# Patient Record
Sex: FEMALE | URBAN - METROPOLITAN AREA
[De-identification: names, ages, dates, MRNs, and addresses within clinical notes are randomized per-mention and may not be internally consistent; named-entity substitution may affect disease eponyms.]

---

## 2023-09-22 ENCOUNTER — TELEPHONE (OUTPATIENT)
Dept: OBGYN CLINIC | Facility: MEDICAL CENTER | Age: 38
End: 2023-09-22

## 2023-09-22 NOTE — TELEPHONE ENCOUNTER
Left message on vm to call back. Please let patient know she has to send her records in for review (blood work, [address]).  She may need to repeat ultrasound since 6 weeks is early and might not capture everything we need to see but we have to review first.

## 2023-09-22 NOTE — TELEPHONE ENCOUNTER
Patient called. Is aware needs to send her records to the office. The fax number was provided. Also is aware she may need to repeat ultrasound since 6 weeks is early.

## 2023-09-22 NOTE — TELEPHONE ENCOUNTER
Patient called. Is currently pregnant with 6 weeks per US. Didn't found her records. Patient is aware we only deliver in [facility] and is ok with that. Please review when you have a chance for next steps. Thank you!

## 2023-10-16 ENCOUNTER — INITIAL PRENATAL (OUTPATIENT)
Dept: OBGYN CLINIC | Facility: MEDICAL CENTER | Age: 38
End: 2023-10-16

## 2023-10-16 ENCOUNTER — APPOINTMENT (OUTPATIENT)
Dept: LAB | Facility: MEDICAL CENTER | Age: 38
End: 2023-10-16
Payer: COMMERCIAL

## 2023-10-16 VITALS
HEIGHT: 67 IN | WEIGHT: 178 LBS | BODY MASS INDEX: 27.94 KG/M2 | SYSTOLIC BLOOD PRESSURE: 110 MMHG | DIASTOLIC BLOOD PRESSURE: 76 MMHG

## 2023-10-16 DIAGNOSIS — Z34.81 PRENATAL CARE, SUBSEQUENT PREGNANCY, FIRST TRIMESTER: Primary | ICD-10-CM

## 2023-10-16 DIAGNOSIS — Z34.81 PRENATAL CARE, SUBSEQUENT PREGNANCY, FIRST TRIMESTER: ICD-10-CM

## 2023-10-16 LAB
ABO GROUP BLD: NORMAL
BASOPHILS # BLD AUTO: 0.07 THOUSANDS/ÂΜL (ref 0–0.1)
BASOPHILS NFR BLD AUTO: 1 % (ref 0–1)
BILIRUB UR QL STRIP: NEGATIVE
BLD GP AB SCN SERPL QL: NEGATIVE
CLARITY UR: CLEAR
COLOR UR: COLORLESS
EOSINOPHIL # BLD AUTO: 0.3 THOUSAND/ÂΜL (ref 0–0.61)
EOSINOPHIL NFR BLD AUTO: 3 % (ref 0–6)
ERYTHROCYTE [DISTWIDTH] IN BLOOD BY AUTOMATED COUNT: 13.2 % (ref 11.6–15.1)
GLUCOSE UR STRIP-MCNC: NEGATIVE MG/DL
HBV SURFACE AG SER QL: NORMAL
HCT VFR BLD AUTO: 39.3 % (ref 34.8–46.1)
HGB BLD-MCNC: 12.3 G/DL (ref 11.5–15.4)
HGB UR QL STRIP.AUTO: NEGATIVE
HIV 1+2 AB+HIV1 P24 AG SERPL QL IA: NORMAL
HIV 2 AB SERPL QL IA: NORMAL
HIV1 AB SERPL QL IA: NORMAL
HIV1 P24 AG SERPL QL IA: NORMAL
IMM GRANULOCYTES # BLD AUTO: 0.04 THOUSAND/UL (ref 0–0.2)
IMM GRANULOCYTES NFR BLD AUTO: 0 % (ref 0–2)
KETONES UR STRIP-MCNC: NEGATIVE MG/DL
LEUKOCYTE ESTERASE UR QL STRIP: NEGATIVE
LYMPHOCYTES # BLD AUTO: 2.27 THOUSANDS/ÂΜL (ref 0.6–4.47)
LYMPHOCYTES NFR BLD AUTO: 23 % (ref 14–44)
MCH RBC QN AUTO: 24.9 PG (ref 26.8–34.3)
MCHC RBC AUTO-ENTMCNC: 31.3 G/DL (ref 31.4–37.4)
MCV RBC AUTO: 80 FL (ref 82–98)
MONOCYTES # BLD AUTO: 0.77 THOUSAND/ÂΜL (ref 0.17–1.22)
MONOCYTES NFR BLD AUTO: 8 % (ref 4–12)
NEUTROPHILS # BLD AUTO: 6.38 THOUSANDS/ÂΜL (ref 1.85–7.62)
NEUTS SEG NFR BLD AUTO: 65 % (ref 43–75)
NITRITE UR QL STRIP: NEGATIVE
NRBC BLD AUTO-RTO: 0 /100 WBCS
PH UR STRIP.AUTO: 6 [PH]
PLATELET # BLD AUTO: 456 THOUSANDS/UL (ref 149–390)
PMV BLD AUTO: 9.7 FL (ref 8.9–12.7)
PROT UR STRIP-MCNC: NEGATIVE MG/DL
RBC # BLD AUTO: 4.94 MILLION/UL (ref 3.81–5.12)
RH BLD: POSITIVE
RUBV IGG SERPL IA-ACNC: 15.3 IU/ML
SP GR UR STRIP.AUTO: 1.01 (ref 1–1.03)
SPECIMEN EXPIRATION DATE: NORMAL
TREPONEMA PALLIDUM IGG+IGM AB [PRESENCE] IN SERUM OR PLASMA BY IMMUNOASSAY: NORMAL
UROBILINOGEN UR STRIP-ACNC: <2 MG/DL
WBC # BLD AUTO: 9.83 THOUSAND/UL (ref 4.31–10.16)

## 2023-10-16 PROCEDURE — 86803 HEPATITIS C AB TEST: CPT

## 2023-10-16 PROCEDURE — 81003 URINALYSIS AUTO W/O SCOPE: CPT

## 2023-10-16 PROCEDURE — 87389 HIV-1 AG W/HIV-1&-2 AB AG IA: CPT

## 2023-10-16 PROCEDURE — 85025 COMPLETE CBC W/AUTO DIFF WBC: CPT

## 2023-10-16 PROCEDURE — 86900 BLOOD TYPING SEROLOGIC ABO: CPT

## 2023-10-16 PROCEDURE — NOBC

## 2023-10-16 PROCEDURE — 86706 HEP B SURFACE ANTIBODY: CPT

## 2023-10-16 PROCEDURE — 36415 COLL VENOUS BLD VENIPUNCTURE: CPT

## 2023-10-16 PROCEDURE — 86901 BLOOD TYPING SEROLOGIC RH(D): CPT

## 2023-10-16 PROCEDURE — 87086 URINE CULTURE/COLONY COUNT: CPT

## 2023-10-16 PROCEDURE — 86762 RUBELLA ANTIBODY: CPT

## 2023-10-16 PROCEDURE — 86850 RBC ANTIBODY SCREEN: CPT

## 2023-10-16 PROCEDURE — 86780 TREPONEMA PALLIDUM: CPT

## 2023-10-16 PROCEDURE — 87340 HEPATITIS B SURFACE AG IA: CPT

## 2023-10-16 RX ORDER — DIPHENHYDRAMINE HYDROCHLORIDE 25 MG/1
CAPSULE ORAL
COMMUNITY
Start: 2023-09-26

## 2023-10-16 NOTE — PROGRESS NOTES
OB History    Para Term  AB Living   2 1 1     1   SAB IAB Ectopic Multiple Live Births           1      # Outcome Date GA Lbr Aniket/2nd Weight Sex Delivery Anes PTL Lv   2 Current            1 Term 19 40w0d  3459 g (7 lb 10 oz) F Vag-Spont  N PIPE         * Pt presents for OB intake  *  *Pt's LMP was Patient's last menstrual period was 2023. *Ultrasound date:2023   7weeks 1days  *Embryo transfer:  2023  *Estimated date of delivery: 2024   * confirmed by embryo transfer    *Signs/Symptoms of Pregnancy   *no Constipation    *no Headaches   *no Cramping  *yes Spotting - mild  *yes  Nausea     Diabetes  If any of the following answers are  yes, please order 1 hour GTT, 50g   *no Hx of GDM    *no BMI >35    *no First degree relative with type 2 diabetes    *no Hx of PCOS   *no Current metformin use    *no Prior hx of LGA/macrosomia    *no AMA with other risk factors    Hypertension-    *no Hx of chronic HTN    *no Hx of gestational HTN   *no hx of preeclampsia, eclampsia, or HELLP syndrome     *Infection Screening   *no Does the pt have a hx of MRSA? *if yes- follow MRSA protocol and obtain a nasal swab for MRSA culture   *no History of herpes?      *Immunizations:   *yes Discussed influenza vaccine   *yes Discussed TDaP vaccine   *yes COVID Vaccine x3    [address]  Depression *no   Anxiety*no  Medications*no    *Interview education   *Handouts given:    *Baby and Me support center     *[employer] sign up instructions    *Lab Locations    *. [facility] Pediatricians List/Choosing Pediatrician Sheet    * [facility] Childbirth [facility] Parenting Classes    *Schedule for Prenatal Visits    *Pregnancy Warning Signs Reviewed    *Safe Medications During Pregnancy    *SMA and CF Testing information sheet    *CPT Code Sheet/MFM 13week NT pamphlet    *[employer]      SBIRT Screen:  Depression Screening Follow-up Plan: Patient's depression screening was negative with an Hurlock score of          *[facility]   *yes discussed genetic testing- pt interested     Patient has been informed of basic prenatal advice such as avoiding alcohol, drugs, and smoking. She should remain hydrated and take daily prenatal vitamins. Patient should avoid caffeine, raw sprouts, high mercury fish, undercooked fish, raw eggs, organ meat, unwashed produce, and unpasteurized cheeses, milk, and fruit juice and undercooked meats. She has been informed about toxoplasmosis and to avoid cat feces. *Details that I feel the provider should be aware of:  Dandre Hastings presented today for initial ob intake at 10w1d. She is a [facility] transfer patient with an embryo transfer of 08/25/2023. She has complaints of nausea which has been managed with Unisom and B6. We reviewed other over the counter medications she can take as well as warning signs and when to contact the office. Prenatal labs were ordered as well as MFM referral placed. PN1 visit scheduled for *10/30/2023. The patient was oriented to our practice and all questions were answered.     Interviewed by:  Katia Bonner RN

## 2023-10-17 ENCOUNTER — TELEPHONE (OUTPATIENT)
Facility: HOSPITAL | Age: 38
End: 2023-10-17

## 2023-10-17 LAB
BACTERIA UR CULT: NORMAL
HBV SURFACE AB SER-ACNC: 35.1 MIU/ML
HCV AB SER QL: NORMAL

## 2023-10-17 NOTE — TELEPHONE ENCOUNTER
Called patient to schedule MFM appointment, based on referral issued to Maternal Fetal Medicine by [facility] office. Left voicemail requesting patient to call back and schedule appointment, with office number for return call [phone].

## 2023-10-19 ENCOUNTER — TELEPHONE (OUTPATIENT)
Facility: HOSPITAL | Age: 38
End: 2023-10-19

## 2023-10-19 NOTE — TELEPHONE ENCOUNTER
Called patient to schedule MFM appointment, based on referral issued to Maternal Fetal Medicine by [facility] office. Left voicemail requesting patient to call back and schedule appointment, with office number for return call [phone].

## 2023-10-30 ENCOUNTER — INITIAL PRENATAL (OUTPATIENT)
Dept: OBGYN CLINIC | Facility: CLINIC | Age: 38
End: 2023-10-30
Payer: COMMERCIAL

## 2023-10-30 VITALS — BODY MASS INDEX: 28.35 KG/M2 | SYSTOLIC BLOOD PRESSURE: 110 MMHG | WEIGHT: 181 LBS | DIASTOLIC BLOOD PRESSURE: 75 MMHG

## 2023-10-30 DIAGNOSIS — Z34.91 INITIAL OBSTETRIC VISIT IN FIRST TRIMESTER: ICD-10-CM

## 2023-10-30 DIAGNOSIS — Z3A.12 12 WEEKS GESTATION OF PREGNANCY: ICD-10-CM

## 2023-10-30 DIAGNOSIS — O09.811 PREGNANCY RESULTING FROM IN VITRO FERTILIZATION IN FIRST TRIMESTER: Primary | ICD-10-CM

## 2023-10-30 PROCEDURE — 90686 IIV4 VACC NO PRSV 0.5 ML IM: CPT | Performed by: OBSTETRICS & GYNECOLOGY

## 2023-10-30 PROCEDURE — G0145 SCR C/V CYTO,THINLAYER,RESCR: HCPCS | Performed by: OBSTETRICS & GYNECOLOGY

## 2023-10-30 PROCEDURE — 87591 N.GONORRHOEAE DNA AMP PROB: CPT | Performed by: OBSTETRICS & GYNECOLOGY

## 2023-10-30 PROCEDURE — 87491 CHLMYD TRACH DNA AMP PROBE: CPT | Performed by: OBSTETRICS & GYNECOLOGY

## 2023-10-30 PROCEDURE — PNV: Performed by: OBSTETRICS & GYNECOLOGY

## 2023-10-30 PROCEDURE — 90471 IMMUNIZATION ADMIN: CPT | Performed by: OBSTETRICS & GYNECOLOGY

## 2023-10-30 NOTE — PROGRESS NOTES
Initial Prenatal Visit  [facility]  [address], [address], [place], [place]    Assessment/Plan:  Geovanni Zhao is a 41y.o. year old  at 12w1d who presents for initial prenatal visit. Supervision of normal pregnancy  - Prenatal labs reviewed and normal.  Blood type: AB+  - Aneuploidy screening discussed. Patient had PGTA  - Routine cervical cancer screening: Pap Done today. - Routine STI Screening: GC/Chlamydia sent today. HIV/Hep B/Syphilis ordered in prenatal panel.  - Patient Education: Patient was counseled regarding diet, exercise, weight gain, foods to avoid, vaccines in pregnancy, aneuploidy screening, travel precautions to include seat belt use and VTE risk reduction. She has been provided our pregnancy packet which includes how and when to contact providers, medication recommendations, dietary suggestions, breastfeeding information as well as websites for additional information, hospital and delivery concerns. Additional Pregnancy Problems:   1. Pregnancy resulting from in vitro fertilization in first trimester    2. 12 weeks gestation of pregnancy    3. Initial obstetric visit in first trimester  -     Liquid-based pap, screening  -     Chlamydia/GC amplified DNA by PCR          Subjective:   CC:  Desires prenatal care  Peyton Resendez is a 41 y.o.  female who presents for prenatal care. Pregnancy ROS: no leakage of fluid, pelvic pain, or vaginal bleeding. no nausea/vomiting. Taking Vitamin B6, unisom    The following portions of the patient's history were reviewed and updated as appropriate: allergies, current medications, past family history, past medical history, obstetric history, gynecologic history, past social history, past surgical history and problem list.      Objective:   Wt 82.1 kg (181 lb)   LMP 2023   BMI 28.35 kg/m²   Pregravid Weight/BMI: Pregravid weight not on file (BMI Could not be calculated)  Current Weight: 82.1 kg (181 lb) Total Weight Gain: Not found. Pre- Vitals      Flowsheet Row Most Recent Value   Prenatal Assessment    Fetal Heart Rate 110/75   Movement Absent   Prenatal Vitals    Weight - Scale 82.1 kg (181 lb)   Urine Albumin/Glucose    Dilation/Effacement/Station    Vaginal Drainage    Edema    LLE Edema None   RLE Edema None   Facial Edema None           General: Well appearing, no distress  Respiratory: Normal respiratory rate, lungs clear to auscultation, no wheezing or rales  Cardiovascular: Regular rate and rhythm, no murmurs, rubs, or gallops  Breasts: Normal bilaterally, nontender without masses, asymmetry, or nipple discharge. Abdomen: Soft, gravid, nontender  : Urethra normal. Normal labia majora and minora. Vagina normal.  No vaginal bleeding. No vaginal discharge. Cervix visually closed. Extremities: Warm and well perfused. Non tender. No edema.

## 2023-11-01 LAB
C TRACH DNA SPEC QL NAA+PROBE: NEGATIVE
N GONORRHOEA DNA SPEC QL NAA+PROBE: NEGATIVE

## 2023-11-02 PROBLEM — O09.522 MULTIGRAVIDA OF ADVANCED MATERNAL AGE IN SECOND TRIMESTER: Status: ACTIVE | Noted: 2023-11-02

## 2023-11-03 ENCOUNTER — ROUTINE PRENATAL (OUTPATIENT)
Dept: PERINATAL CARE | Facility: OTHER | Age: 38
End: 2023-11-03
Payer: COMMERCIAL

## 2023-11-03 VITALS
DIASTOLIC BLOOD PRESSURE: 60 MMHG | HEART RATE: 97 BPM | SYSTOLIC BLOOD PRESSURE: 90 MMHG | HEIGHT: 67 IN | BODY MASS INDEX: 27.88 KG/M2 | WEIGHT: 177.6 LBS

## 2023-11-03 DIAGNOSIS — Z36.82 ENCOUNTER FOR (NT) NUCHAL TRANSLUCENCY SCAN: Primary | ICD-10-CM

## 2023-11-03 DIAGNOSIS — Z34.81 PRENATAL CARE, SUBSEQUENT PREGNANCY, FIRST TRIMESTER: ICD-10-CM

## 2023-11-03 DIAGNOSIS — O09.522 MULTIGRAVIDA OF ADVANCED MATERNAL AGE IN SECOND TRIMESTER: ICD-10-CM

## 2023-11-03 DIAGNOSIS — Z3A.12 12 WEEKS GESTATION OF PREGNANCY: ICD-10-CM

## 2023-11-03 DIAGNOSIS — O09.812 PREGNANCY RESULTING FROM IN VITRO FERTILIZATION IN SECOND TRIMESTER: ICD-10-CM

## 2023-11-03 LAB
LAB AP GYN PRIMARY INTERPRETATION: NORMAL
LAB AP LMP: NORMAL
Lab: NORMAL

## 2023-11-03 PROCEDURE — 36415 COLL VENOUS BLD VENIPUNCTURE: CPT | Performed by: OBSTETRICS & GYNECOLOGY

## 2023-11-03 PROCEDURE — 76813 OB US NUCHAL MEAS 1 GEST: CPT | Performed by: OBSTETRICS & GYNECOLOGY

## 2023-11-03 PROCEDURE — 99243 OFF/OP CNSLTJ NEW/EST LOW 30: CPT | Performed by: OBSTETRICS & GYNECOLOGY

## 2023-11-03 PROCEDURE — 76801 OB US < 14 WKS SINGLE FETUS: CPT | Performed by: OBSTETRICS & GYNECOLOGY

## 2023-11-03 NOTE — PROGRESS NOTES
[address]: Ivory Lynch was seen today for nuchal translucency ultrasound. See ultrasound report under "OB Procedures" tab. MDM:   I. Diagnoses/Problems addressed:  Aneuploidy screening, AMA, IVF  II. Data: I ordered the following tests: NIPS. III.   Risk of morbidity: Moderate (aspirin)    Please don't hesitate to contact our office with any concerns or questions.  -Doreen Nowak MD

## 2023-11-03 NOTE — PATIENT INSTRUCTIONS
You elected to have non-invasive prenatal screening (NIPS). This involves a blood test to check for the four most common genetic syndromes (Trisomy 21, Trisomy 13, Trisomy 25, and sex chromosome abnormalities). It also MAY report the biologic sex of the fetus if you opted to learn this information. You can call our office to verbally review results to avoid inadvertently learning this information via [employer] Â®, if desired. Results will be visible in your [employer] portal 7-10 business days from when the test is drawn. Please follow all instructions regarding insurance cost/coverage provided to you today. Please contact our office with any concerns or questions. You will need spina bifida screening (called MSAFP) for the baby beginning at 15 weeks gestation, which will be ordered by your obstetrician's office. This test allows for earlier detection of spina bifida than is possible by ultrasound, and is advised in all pregnancies. The use of low dose aspirin in pregnancy (162mg) is recommended in women with a high risk, or multiple moderate risk factors for preeclampsia. Aspirin therapy should be initiated between 12-28 weeks gestation, and is most effective if started prior to 16 weeks gestation, and continued until 36 weeks gestation. Low dose aspirin in pregnancy has been shown to reduce the incidence of preeclampsia in women with risk factors, and has been shown to be safe and without significant maternal or fetal risk. In light of your risk factors for preeclampsia, including: Maternal Age 28 or greater and IVF I recommend initiating aspirin therapy.

## 2023-11-03 NOTE — LETTER
November 3, 2023    Nargis Palmer MD   [facility]    Patient: Drake Hernandez   YOB: 1985   Date of Visit: 11/3/2023   Gestational age Beth Poe of this communication: Routine       Dear Dr Carlton Jonas,    This patient was seen recently in our  office. The content of my evaluation today is in the ultrasound report under "OB Procedures" tab. Please don't hesitate to contact our office with any concerns or questions.      Sincerely,      Nehemiah Roca MD  Attending Physician, [address] [place] [address]

## 2023-11-03 NOTE — PROGRESS NOTES
Patient chose to have [facility] Non-invasive Prenatal Screen with fetal sex. Patient choose billed through insurance. Patient assistance program (PAP) application provided to patient no. PAP sent with specimen No.     Patient given brochure and is aware [employer] will contact their insurance and coordinate coverage. Patient made aware she will receive a text message and e-mail from [employer]. Patient informed text/phone call message will come from area code  "415". Provided [employer] # [phone] and web site at [email or web address]   "[facility] your test online" card with barcode and test tube ID provided to patient. Reviewed [facility] web site states 5-7 business days for results via their portal.   [employer] message will be sent to patient when M receives results /provider reviews. 2 vials of blood drawn from  left  arm by Kiara Cabral. Patient tolerated blood draw without difficulty. Specimens labeled with patient identifiers (name, date of birth, specimen collection date), order and specimen were verified with patient, packed and sent via [address]. [employer]  tracking #  [id number]  Copy of lab order scanned to Epic media. Maternal Fetal Medicine will have results in approximately 7-10 business days and will call patient or notify via [address] [place] [address]. Patient aware viewing lab result online will reveal fetal sex if ordered. Patient verbalized understanding of all instructions and no questions at this time.

## 2023-11-16 ENCOUNTER — ROUTINE PRENATAL (OUTPATIENT)
Dept: OBGYN CLINIC | Facility: MEDICAL CENTER | Age: 38
End: 2023-11-16

## 2023-11-16 VITALS — WEIGHT: 180 LBS | SYSTOLIC BLOOD PRESSURE: 102 MMHG | BODY MASS INDEX: 28.19 KG/M2 | DIASTOLIC BLOOD PRESSURE: 68 MMHG

## 2023-11-16 DIAGNOSIS — O09.812 PREGNANCY RESULTING FROM IN VITRO FERTILIZATION IN SECOND TRIMESTER: Primary | ICD-10-CM

## 2023-11-16 DIAGNOSIS — Z3A.14 14 WEEKS GESTATION OF PREGNANCY: ICD-10-CM

## 2023-11-16 DIAGNOSIS — O09.522 MULTIGRAVIDA OF ADVANCED MATERNAL AGE IN SECOND TRIMESTER: ICD-10-CM

## 2023-11-16 PROCEDURE — PNV: Performed by: STUDENT IN AN ORGANIZED HEALTH CARE EDUCATION/TRAINING PROGRAM

## 2023-11-16 NOTE — PROGRESS NOTES
Routine Prenatal Visit  [facility] [address] [place] [address]    Assessment/Plan:  [facility] is a 41y.o. year old  at 14w4d who presents for routine prenatal visit. 1. Pregnancy resulting from in vitro fertilization in second trimester    2. Multigravida of advanced maternal age in second trimester    3. 14 weeks gestation of pregnancy          Subjective:     CC: Prenatal care    Julio C Kirkland is a 41 y.o.  female who presents for routine prenatal care at 14w4d. Pregnancy ROS: Denies leakage of fluid, pelvic pain, or vaginal bleeding. The following portions of the patient's history were reviewed and updated as appropriate: allergies, current medications, past family history, past medical history, obstetric history, gynecologic history, past social history, past surgical history and problem list.      Objective:  /68   Wt 81.6 kg (180 lb)   LMP 2023   BMI 28.19 kg/m²   Pregravid Weight/BMI: 80.7 kg (178 lb) (BMI 27.87)  Current Weight: 81.6 kg (180 lb)   Total Weight Gain: 0.907 kg (2 lb)   Pre- Vitals      Flowsheet Row Most Recent Value   Prenatal Assessment    Movement Absent   Prenatal Vitals    Blood Pressure 102/68   Weight - Scale 81.6 kg (180 lb)   Urine Albumin/Glucose    Dilation/Effacement/Station    Vaginal Drainage    Draining Fluid No   Edema    LLE Edema None   RLE Edema None   Facial Edema None             General: Well appearing, no distress  Respiratory: Unlabored breathing  Cardiovascular: Regular rate. Abdomen: Soft, gravid, nontender  Fundal Height: Appropriate for gestational age. Extremities: Warm and well perfused. Non tender.

## 2023-12-12 ENCOUNTER — ROUTINE PRENATAL (OUTPATIENT)
Dept: OBGYN CLINIC | Facility: MEDICAL CENTER | Age: 38
End: 2023-12-12

## 2023-12-12 ENCOUNTER — APPOINTMENT (OUTPATIENT)
Dept: LAB | Facility: MEDICAL CENTER | Age: 38
End: 2023-12-12
Payer: COMMERCIAL

## 2023-12-12 VITALS — SYSTOLIC BLOOD PRESSURE: 120 MMHG | WEIGHT: 186.7 LBS | DIASTOLIC BLOOD PRESSURE: 60 MMHG | BODY MASS INDEX: 29.24 KG/M2

## 2023-12-12 DIAGNOSIS — Z34.92 SECOND TRIMESTER PREGNANCY: Primary | ICD-10-CM

## 2023-12-12 DIAGNOSIS — O09.812 PREGNANCY RESULTING FROM IN VITRO FERTILIZATION IN SECOND TRIMESTER: ICD-10-CM

## 2023-12-12 DIAGNOSIS — O09.522 MULTIGRAVIDA OF ADVANCED MATERNAL AGE IN SECOND TRIMESTER: ICD-10-CM

## 2023-12-12 DIAGNOSIS — Z3A.18 18 WEEKS GESTATION OF PREGNANCY: ICD-10-CM

## 2023-12-12 DIAGNOSIS — Z34.92 SECOND TRIMESTER PREGNANCY: ICD-10-CM

## 2023-12-12 PROCEDURE — PNV: Performed by: OBSTETRICS & GYNECOLOGY

## 2023-12-12 PROCEDURE — 36415 COLL VENOUS BLD VENIPUNCTURE: CPT

## 2023-12-12 PROCEDURE — 82105 ALPHA-FETOPROTEIN SERUM: CPT

## 2023-12-12 NOTE — PROGRESS NOTES
Assessment  41 y.o.  at 18w2d presenting for routine prenatal visit. Plan  Diagnoses and all orders for this visit:    Second trimester pregnancy  18 weeks gestation of pregnancy  -     Second trimester precautions  -  Normal movement  - Level 2 scheduled  - Return in 4wks for PN    Pregnancy resulting from in vitro fertilization in second trimester  Multigravida of advanced maternal age in second trimester  -     Follows with MFM  - Normal NIPT  - Alpha fetoprotein, maternal; Future      ____________________________________________________________        Subjective    Doe Sampson is a 41 y.o. Derrill Solian at 18w2d who presents for routine prenatal visit. She is noting continued nausea, well managed with B6/unisom. Took brief break from med to assess need and symptoms returned, so resumed use. Denies contractions, loss of fluid, or vaginal bleeding. She feels fluttering fetal movements. Pregnancy Problems:  Patient Active Problem List   Diagnosis    Multigravida of advanced maternal age in second trimester    Pregnancy resulting from in vitro fertilization in second trimester    18 weeks gestation of pregnancy         Objective  /60   Wt 84.7 kg (186 lb 11.2 oz)   LMP 2023   BMI 29.24 kg/m²     FHT: 153 BPM   Uterine Size: size equals dates     Physical Exam:  Physical Exam  Constitutional:       General: She is not in acute distress. Appearance: Normal appearance. She is well-developed. She is not ill-appearing, toxic-appearing or diaphoretic. HENT:      Head: Normocephalic and atraumatic. Eyes:      General: No scleral icterus. Right eye: No discharge. Left eye: No discharge. Conjunctiva/sclera: Conjunctivae normal.   Pulmonary:      Effort: Pulmonary effort is normal. No accessory muscle usage or respiratory distress. Abdominal:      General: There is distension (gravid). Tenderness: There is no abdominal tenderness. There is no guarding or rebound. Skin:     General: Skin is warm and dry. Coloration: Skin is not jaundiced. Findings: No bruising, erythema or rash. Neurological:      Mental Status: She is alert. Psychiatric:         Mood and Affect: Mood normal.         Behavior: Behavior normal.         Thought Content:  Thought content normal.         Judgment: Judgment normal.

## 2023-12-14 LAB
2ND TRIMESTER 4 SCREEN SERPL-IMP: NORMAL
AFP ADJ MOM SERPL: 1.58
AFP INTERP AMN-IMP: NORMAL
AFP INTERP SERPL-IMP: NORMAL
AFP INTERP SERPL-IMP: NORMAL
AFP SERPL-MCNC: 64.4 NG/ML
AGE AT DELIVERY: 38 YR
GA METHOD: NORMAL
GA: 18.3 WEEKS
IDDM PATIENT QL: NO
MULTIPLE PREGNANCY: NO
NEURAL TUBE DEFECT RISK FETUS: 2212 %

## 2023-12-26 ENCOUNTER — ROUTINE PRENATAL (OUTPATIENT)
Dept: PERINATAL CARE | Facility: OTHER | Age: 38
End: 2023-12-26
Payer: COMMERCIAL

## 2023-12-26 VITALS
HEIGHT: 67 IN | SYSTOLIC BLOOD PRESSURE: 94 MMHG | DIASTOLIC BLOOD PRESSURE: 50 MMHG | BODY MASS INDEX: 29.82 KG/M2 | HEART RATE: 95 BPM | WEIGHT: 190 LBS

## 2023-12-26 DIAGNOSIS — O09.812 PREGNANCY RESULTING FROM IN VITRO FERTILIZATION IN SECOND TRIMESTER: ICD-10-CM

## 2023-12-26 DIAGNOSIS — Z3A.20 20 WEEKS GESTATION OF PREGNANCY: Primary | ICD-10-CM

## 2023-12-26 DIAGNOSIS — O09.522 MULTIGRAVIDA OF ADVANCED MATERNAL AGE IN SECOND TRIMESTER: ICD-10-CM

## 2023-12-26 PROCEDURE — 76817 TRANSVAGINAL US OBSTETRIC: CPT | Performed by: OBSTETRICS & GYNECOLOGY

## 2023-12-26 PROCEDURE — 76811 OB US DETAILED SNGL FETUS: CPT | Performed by: OBSTETRICS & GYNECOLOGY

## 2023-12-26 PROCEDURE — 99213 OFFICE O/P EST LOW 20 MIN: CPT | Performed by: OBSTETRICS & GYNECOLOGY

## 2023-12-26 NOTE — PROGRESS NOTES
Ultrasound Probe Disinfection    A transvaginal ultrasound was performed.   Prior to use, disinfection was performed with High Level Disinfection Process ([employer]).  Probe serial number M1: [id number]   was used.      Ama Alegria  12/26/23  8:03 AM

## 2023-12-26 NOTE — PROGRESS NOTES
A fetal ultrasound was completed. See Ob procedures in Epic for an interpretation and recommendations. Do not hesitate to contact us in [facility] if you have questions.    Romero Eckert MD, MSCE  Maternal Fetal Medicine

## 2023-12-26 NOTE — LETTER
December 26, 2023     Katie Sánchez MD  [address] [place] [address]  [address] [place] [address]    Patient: Ama Morris   YOB: 1985   Date of Visit: 12/26/2023       Dear Dr. Sánchez:    Thank you for referring Ama Morris to me for evaluation. Below are my notes for this consultation.    If you have questions, please do not hesitate to call me. I look forward to following your patient along with you.         Sincerely,        Romero Eckert MD        CC: No Recipients    Romero Eckert MD  12/26/2023  9:29 AM  Sign when Signing Visit  A fetal ultrasound was completed. See Ob procedures in Epic for an interpretation and recommendations. Do not hesitate to contact us in [facility] if you have questions.    Romero Eckert MD, MSCE  Maternal Fetal Medicine

## 2024-01-09 ENCOUNTER — ROUTINE PRENATAL (OUTPATIENT)
Dept: OBGYN CLINIC | Facility: MEDICAL CENTER | Age: 39
End: 2024-01-09

## 2024-01-09 VITALS — BODY MASS INDEX: 30.43 KG/M2 | SYSTOLIC BLOOD PRESSURE: 95 MMHG | DIASTOLIC BLOOD PRESSURE: 70 MMHG | WEIGHT: 194.3 LBS

## 2024-01-09 DIAGNOSIS — Z3A.22 22 WEEKS GESTATION OF PREGNANCY: ICD-10-CM

## 2024-01-09 DIAGNOSIS — O09.522 MULTIGRAVIDA OF ADVANCED MATERNAL AGE IN SECOND TRIMESTER: ICD-10-CM

## 2024-01-09 DIAGNOSIS — O43.192 MARGINAL INSERTION OF UMBILICAL CORD AFFECTING MANAGEMENT OF MOTHER IN SECOND TRIMESTER: ICD-10-CM

## 2024-01-09 DIAGNOSIS — Z34.92 SECOND TRIMESTER PREGNANCY: Primary | ICD-10-CM

## 2024-01-09 DIAGNOSIS — O09.812 PREGNANCY RESULTING FROM IN VITRO FERTILIZATION IN SECOND TRIMESTER: ICD-10-CM

## 2024-01-09 PROCEDURE — PNV: Performed by: OBSTETRICS & GYNECOLOGY

## 2024-01-09 NOTE — PROGRESS NOTES
Assessment  38 y.o.  at 22w2d presenting for routine prenatal visit.     Plan  Diagnoses and all orders for this visit:    Second trimester pregnancy  22 weeks gestation of pregnancy  -     Second trimester precautions  - Normal movement discussed  - Level 2 reviewed  - 28wk labs given, can consider having done with next visit  - Discussed Tdap, RSV vaccines. Agreeable to both when due  - Return in 4wk for PN    Pregnancy resulting from in vitro fertilization in second trimester  AMA multigravida in 2nd trimester  Marginal umbilical cord insertion  - NIPT and AFP wnl  - Fetal echo scheduled  - Continued growth surveillance with MFM    ____________________________________________________________        Subjective    Ama Morris is a 38 y.o.  at 22w2d who presents for routine prenatal visit. She is without complaint. She denies contractions, loss of fluid, or vaginal bleeding. She feels regular fetal movements all the time. Tries to listen with doppler but difficult to find baby when tries.     Pregnancy Problems:  Patient Active Problem List   Diagnosis    Multigravida of advanced maternal age in second trimester    Pregnancy resulting from in vitro fertilization in second trimester    22 weeks gestation of pregnancy         Objective  BP 95/70   Wt 88.1 kg (194 lb 4.8 oz)   LMP 2023   BMI 30.43 kg/m²     FHT: 145 BPM   Uterine Size: size equals dates     Physical Exam:  Physical Exam  Constitutional:       General: She is not in acute distress.     Appearance: Normal appearance. She is well-developed. She is not ill-appearing, toxic-appearing or diaphoretic.   HENT:      Head: Normocephalic and atraumatic.   Eyes:      General: No scleral icterus.        Right eye: No discharge.         Left eye: No discharge.      Conjunctiva/sclera: Conjunctivae normal.   Pulmonary:      Effort: Pulmonary effort is normal. No accessory muscle usage or respiratory distress.   Abdominal:      General:  There is distension (gravid).      Tenderness: There is no abdominal tenderness. There is no guarding or rebound.   Skin:     General: Skin is warm and dry.      Coloration: Skin is not jaundiced.      Findings: No bruising, erythema or rash.   Neurological:      Mental Status: She is alert.   Psychiatric:         Mood and Affect: Mood normal.         Behavior: Behavior normal.         Thought Content: Thought content normal.         Judgment: Judgment normal.

## 2024-01-15 ENCOUNTER — ROUTINE PRENATAL (OUTPATIENT)
Dept: PERINATAL CARE | Facility: OTHER | Age: 39
End: 2024-01-15
Payer: COMMERCIAL

## 2024-01-15 VITALS
HEIGHT: 67 IN | HEART RATE: 88 BPM | DIASTOLIC BLOOD PRESSURE: 68 MMHG | WEIGHT: 197.2 LBS | SYSTOLIC BLOOD PRESSURE: 110 MMHG | BODY MASS INDEX: 30.95 KG/M2

## 2024-01-15 DIAGNOSIS — Z3A.23 23 WEEKS GESTATION OF PREGNANCY: Primary | ICD-10-CM

## 2024-01-15 DIAGNOSIS — O09.812 PREGNANCY RESULTING FROM IN VITRO FERTILIZATION IN SECOND TRIMESTER: ICD-10-CM

## 2024-01-15 PROCEDURE — 76825 ECHO EXAM OF FETAL HEART: CPT | Performed by: OBSTETRICS & GYNECOLOGY

## 2024-01-15 PROCEDURE — 76827 ECHO EXAM OF FETAL HEART: CPT | Performed by: OBSTETRICS & GYNECOLOGY

## 2024-01-15 PROCEDURE — 93325 DOPPLER ECHO COLOR FLOW MAPG: CPT | Performed by: OBSTETRICS & GYNECOLOGY

## 2024-01-15 NOTE — PROGRESS NOTES
Please refer to the [facility] ultrasound report in Ob Procedures for additional information regarding today's visit

## 2024-01-15 NOTE — LETTER
January 15, 2024     Cristela Link MD  [address] [place] [address]    Patient: Ama Morris   YOB: 1985   Date of Visit: 1/15/2024       Dear Dr. Link:    Thank you for referring Ama Morris to me for evaluation. Below are my notes for this consultation.    If you have questions, please do not hesitate to call me. I look forward to following your patient along with you.         Sincerely,        Jack Harding MD        CC: No Recipients    Jack Harding MD  1/15/2024 10:47 AM  Sign when Signing Visit  Please refer to the [facility] ultrasound report in Ob Procedures for additional information regarding today's visit   Monitor.

## 2024-01-26 ENCOUNTER — ULTRASOUND (OUTPATIENT)
Dept: PERINATAL CARE | Facility: OTHER | Age: 39
End: 2024-01-26
Payer: COMMERCIAL

## 2024-01-26 VITALS
HEIGHT: 67 IN | WEIGHT: 207.6 LBS | BODY MASS INDEX: 32.58 KG/M2 | DIASTOLIC BLOOD PRESSURE: 64 MMHG | SYSTOLIC BLOOD PRESSURE: 120 MMHG | HEART RATE: 86 BPM

## 2024-01-26 DIAGNOSIS — Z3A.24 24 WEEKS GESTATION OF PREGNANCY: ICD-10-CM

## 2024-01-26 DIAGNOSIS — O09.522 MULTIGRAVIDA OF ADVANCED MATERNAL AGE IN SECOND TRIMESTER: ICD-10-CM

## 2024-01-26 DIAGNOSIS — O09.812 PREGNANCY RESULTING FROM IN VITRO FERTILIZATION IN SECOND TRIMESTER: Primary | ICD-10-CM

## 2024-01-26 PROCEDURE — 76816 OB US FOLLOW-UP PER FETUS: CPT | Performed by: OBSTETRICS & GYNECOLOGY

## 2024-01-26 PROCEDURE — 99213 OFFICE O/P EST LOW 20 MIN: CPT | Performed by: OBSTETRICS & GYNECOLOGY

## 2024-01-26 NOTE — PROGRESS NOTES
The patient was seen today for an ultrasound.  Please see ultrasound report (located under Ob Procedures) for additional details.   Thank you very much for allowing us to participate in the care of this very nice patient.  Should you have any questions, please do not hesitate to contact me.     Miguelito Soliz MD FACOG  Attending Physician, Maternal-Fetal Medicine  [facility]

## 2024-02-05 ENCOUNTER — APPOINTMENT (OUTPATIENT)
Dept: LAB | Facility: CLINIC | Age: 39
End: 2024-02-05
Payer: COMMERCIAL

## 2024-02-05 DIAGNOSIS — Z34.92 SECOND TRIMESTER PREGNANCY: ICD-10-CM

## 2024-02-05 DIAGNOSIS — Z3A.22 22 WEEKS GESTATION OF PREGNANCY: ICD-10-CM

## 2024-02-05 LAB
BASOPHILS # BLD AUTO: 0.06 THOUSANDS/ÂΜL (ref 0–0.1)
BASOPHILS NFR BLD AUTO: 1 % (ref 0–1)
EOSINOPHIL # BLD AUTO: 0.08 THOUSAND/ÂΜL (ref 0–0.61)
EOSINOPHIL NFR BLD AUTO: 1 % (ref 0–6)
ERYTHROCYTE [DISTWIDTH] IN BLOOD BY AUTOMATED COUNT: 13.2 % (ref 11.6–15.1)
GLUCOSE 1H P 50 G GLC PO SERPL-MCNC: 125 MG/DL (ref 40–134)
HCT VFR BLD AUTO: 33.6 % (ref 34.8–46.1)
HGB BLD-MCNC: 10.5 G/DL (ref 11.5–15.4)
IMM GRANULOCYTES # BLD AUTO: 0.13 THOUSAND/UL (ref 0–0.2)
IMM GRANULOCYTES NFR BLD AUTO: 1 % (ref 0–2)
LYMPHOCYTES # BLD AUTO: 1.78 THOUSANDS/ÂΜL (ref 0.6–4.47)
LYMPHOCYTES NFR BLD AUTO: 15 % (ref 14–44)
MCH RBC QN AUTO: 24.9 PG (ref 26.8–34.3)
MCHC RBC AUTO-ENTMCNC: 31.3 G/DL (ref 31.4–37.4)
MCV RBC AUTO: 80 FL (ref 82–98)
MONOCYTES # BLD AUTO: 0.78 THOUSAND/ÂΜL (ref 0.17–1.22)
MONOCYTES NFR BLD AUTO: 7 % (ref 4–12)
NEUTROPHILS # BLD AUTO: 9.05 THOUSANDS/ÂΜL (ref 1.85–7.62)
NEUTS SEG NFR BLD AUTO: 75 % (ref 43–75)
NRBC BLD AUTO-RTO: 0 /100 WBCS
PLATELET # BLD AUTO: 414 THOUSANDS/UL (ref 149–390)
PMV BLD AUTO: 9.5 FL (ref 8.9–12.7)
RBC # BLD AUTO: 4.21 MILLION/UL (ref 3.81–5.12)
TREPONEMA PALLIDUM IGG+IGM AB [PRESENCE] IN SERUM OR PLASMA BY IMMUNOASSAY: NORMAL
WBC # BLD AUTO: 11.88 THOUSAND/UL (ref 4.31–10.16)

## 2024-02-05 PROCEDURE — 85025 COMPLETE CBC W/AUTO DIFF WBC: CPT

## 2024-02-05 PROCEDURE — 82950 GLUCOSE TEST: CPT

## 2024-02-05 PROCEDURE — 36415 COLL VENOUS BLD VENIPUNCTURE: CPT

## 2024-02-05 PROCEDURE — 86780 TREPONEMA PALLIDUM: CPT

## 2024-02-06 ENCOUNTER — ROUTINE PRENATAL (OUTPATIENT)
Dept: OBGYN CLINIC | Facility: MEDICAL CENTER | Age: 39
End: 2024-02-06

## 2024-02-06 VITALS — DIASTOLIC BLOOD PRESSURE: 72 MMHG | WEIGHT: 203 LBS | SYSTOLIC BLOOD PRESSURE: 120 MMHG | BODY MASS INDEX: 31.79 KG/M2

## 2024-02-06 DIAGNOSIS — O43.192 MARGINAL INSERTION OF UMBILICAL CORD AFFECTING MANAGEMENT OF MOTHER IN SECOND TRIMESTER: ICD-10-CM

## 2024-02-06 DIAGNOSIS — O09.812 PREGNANCY RESULTING FROM IN VITRO FERTILIZATION IN SECOND TRIMESTER: ICD-10-CM

## 2024-02-06 DIAGNOSIS — Z3A.26 26 WEEKS GESTATION OF PREGNANCY: Primary | ICD-10-CM

## 2024-02-06 PROCEDURE — PNV: Performed by: OBSTETRICS & GYNECOLOGY

## 2024-02-06 NOTE — PROGRESS NOTES
Ama is a 38 y.o. year old  at 26w2d for routine prenatal visit.   + FM, no vaginal bleeding, contractions, or LOF  Complaints: No - does state will have occasional tightening mainly at night  - likely braxtons   Most recent ultrasound and labs reviewed.  We reviewed her  28 week labs - normal ,Hgb 10.5  - will do oral iron    Follow  up in 2 weeks    IVF pregnancy / marginal cord insertion  /AMA - has third trimester scan

## 2024-02-19 PROBLEM — O09.813 PREGNANCY RESULTING FROM IN VITRO FERTILIZATION IN THIRD TRIMESTER: Status: ACTIVE | Noted: 2023-11-03

## 2024-02-19 PROBLEM — O09.523 MULTIGRAVIDA OF ADVANCED MATERNAL AGE IN THIRD TRIMESTER: Status: ACTIVE | Noted: 2023-11-02

## 2024-02-19 PROBLEM — Z3A.28 28 WEEKS GESTATION OF PREGNANCY: Status: ACTIVE | Noted: 2023-11-16

## 2024-02-19 PROBLEM — O43.193 MARGINAL INSERTION OF UMBILICAL CORD AFFECTING MANAGEMENT OF MOTHER IN THIRD TRIMESTER: Status: ACTIVE | Noted: 2024-01-09

## 2024-02-22 ENCOUNTER — ROUTINE PRENATAL (OUTPATIENT)
Dept: OBGYN CLINIC | Facility: MEDICAL CENTER | Age: 39
End: 2024-02-22
Payer: COMMERCIAL

## 2024-02-22 VITALS — SYSTOLIC BLOOD PRESSURE: 104 MMHG | BODY MASS INDEX: 32.48 KG/M2 | WEIGHT: 207.4 LBS | DIASTOLIC BLOOD PRESSURE: 74 MMHG

## 2024-02-22 DIAGNOSIS — O09.813 PREGNANCY RESULTING FROM IN VITRO FERTILIZATION IN THIRD TRIMESTER: ICD-10-CM

## 2024-02-22 DIAGNOSIS — Z23 ENCOUNTER FOR IMMUNIZATION: ICD-10-CM

## 2024-02-22 DIAGNOSIS — Z3A.28 28 WEEKS GESTATION OF PREGNANCY: ICD-10-CM

## 2024-02-22 DIAGNOSIS — O09.523 MULTIGRAVIDA OF ADVANCED MATERNAL AGE IN THIRD TRIMESTER: ICD-10-CM

## 2024-02-22 DIAGNOSIS — O43.193 MARGINAL INSERTION OF UMBILICAL CORD AFFECTING MANAGEMENT OF MOTHER IN THIRD TRIMESTER: Primary | ICD-10-CM

## 2024-02-22 PROCEDURE — 90471 IMMUNIZATION ADMIN: CPT | Performed by: NURSE PRACTITIONER

## 2024-02-22 PROCEDURE — 90715 TDAP VACCINE 7 YRS/> IM: CPT | Performed by: NURSE PRACTITIONER

## 2024-02-22 PROCEDURE — PNV: Performed by: NURSE PRACTITIONER

## 2024-02-22 RX ORDER — PNV NO.95/FERROUS FUM/FOLIC AC 28MG-0.8MG
TABLET ORAL
COMMUNITY

## 2024-02-22 NOTE — PROGRESS NOTES
Denies loss of fluid, vaginal bleeding and abdominal pain.  Confirms frequent fetal movement.  Reviewed 28-week labs significant for maternal anemia.  Reviewed recommendation for Tdap vaccine, patient agreeable to administration at today's visit  Patient plans include formula feeding, epidural for pain control during labor, male sterilization for postpartum contraception and Dr. Pleitez for pediatrician.  BP: 104/74 weight:+29lbs  Plan   -continue prenatal vitamins daily  -Fetal kick counts reviewed, encouraged daily and written information provided  -Anemia in pregnancy reviewed to take iron every other day on an empty stomach with orange juice for best absorption  -Tdap vaccine today.  28-week folder provided and reviewed.  Consent for delivery signed  -Follow-up with  center scheduled 3/12/24  -Common discomforts of pregnancy and precautions including  labor reviewed.  Signs and symptoms to report reviewed.  RTO 2 weeks

## 2024-03-07 ENCOUNTER — ROUTINE PRENATAL (OUTPATIENT)
Dept: OBGYN CLINIC | Facility: MEDICAL CENTER | Age: 39
End: 2024-03-07

## 2024-03-07 VITALS — DIASTOLIC BLOOD PRESSURE: 72 MMHG | BODY MASS INDEX: 32.73 KG/M2 | WEIGHT: 209 LBS | SYSTOLIC BLOOD PRESSURE: 108 MMHG

## 2024-03-07 DIAGNOSIS — O09.523 MULTIGRAVIDA OF ADVANCED MATERNAL AGE IN THIRD TRIMESTER: Primary | ICD-10-CM

## 2024-03-07 DIAGNOSIS — Z3A.30 30 WEEKS GESTATION OF PREGNANCY: ICD-10-CM

## 2024-03-07 DIAGNOSIS — O43.193 MARGINAL INSERTION OF UMBILICAL CORD AFFECTING MANAGEMENT OF MOTHER IN THIRD TRIMESTER: ICD-10-CM

## 2024-03-07 PROCEDURE — PNV: Performed by: STUDENT IN AN ORGANIZED HEALTH CARE EDUCATION/TRAINING PROGRAM

## 2024-03-07 NOTE — PROGRESS NOTES
Routine Prenatal Visit  [facility] [address], [place], [place]    Assessment/Plan:  Ama is a 38 y.o. year old  at 30w4d who presents for routine prenatal visit.     1. Multigravida of advanced maternal age in third trimester    2. Marginal insertion of umbilical cord affecting management of mother in third trimester    3. 30 weeks gestation of pregnancy          Subjective:     CC: Prenatal care    Ama Morris is a 38 y.o.  female who presents for routine prenatal care at 30w4d.  Pregnancy ROS: Denies leakage of fluid, pelvic pain, or vaginal bleeding.  Reports normal fetal movement.    The following portions of the patient's history were reviewed and updated as appropriate: allergies, current medications, past family history, past medical history, obstetric history, gynecologic history, past social history, past surgical history and problem list.      Objective:  /72   Wt 94.8 kg (209 lb)   LMP 2023   BMI 32.73 kg/m²   Pregravid Weight/BMI: 80.7 kg (178 lb) (BMI 27.87)  Current Weight: 94.8 kg (209 lb)   Total Weight Gain: 14.1 kg (31 lb)   Pre-Tejas Vitals      Flowsheet Row Most Recent Value   Prenatal Assessment    Fetal Heart Rate 154   Fundal Height (cm) 30 cm   Movement Present   Prenatal Vitals    Blood Pressure 108/72   Weight - Scale 94.8 kg (209 lb)   Urine Albumin/Glucose    Dilation/Effacement/Station    Vaginal Drainage    Draining Fluid No   Edema    LLE Edema Trace   RLE Edema Trace   Facial Edema None             General: Well appearing, no distress  Respiratory: Unlabored breathing  Cardiovascular: Regular rate.  Abdomen: Soft, gravid, nontender  Fundal Height: Appropriate for gestational age.  Extremities: Warm and well perfused.  Non tender.

## 2024-03-11 ENCOUNTER — NURSE TRIAGE (OUTPATIENT)
Age: 39
End: 2024-03-11

## 2024-03-11 NOTE — TELEPHONE ENCOUNTER
"Patient called in with concerns for a yeast infection.  Has white discharge and itching with irritation.  She wanted to know what OTC medications she can take.      Reviewed with patient that she can take OTC Monistat during pregnancy - recommended the 7 day dose.  Advised to call back the office if symptoms do not improve after completing.      She has a routine OB appointment on 03/20.  Recommended to follow up with us at that time if symptoms do not improve or sooner if they worsen.      She has no further questions at this time.        Reason for Disposition   Symptoms of a vaginal yeast infection (i.e., white, thick, cottage-cheese-like, itchy, not bad smelling discharge)    Answer Assessment - Initial Assessment Questions  1. DISCHARGE: \"Describe the discharge.\" (e.g., white, yellow, green, gray, foamy, cottage cheese-like)      White     2. ODOR: \"Is there a bad odor?\"      Declines    3. ONSET: \"When did the discharge begin?\"      About 2 days    4. RASH: \"Is there a rash in that area?\" If Yes, ask: \"Describe it.\" (e.g., redness, blisters, sores, bumps)      Denies    5. ABDOMINAL PAIN: \"Are you having any abdominal pain?\" If Yes, ask: \"What does it feel like?\" (e.g., crampy, dull, intermittent, constant)       Denies    6. ABDOMINAL PAIN SEVERITY: If present, ask: \"How bad is it?\"  (e.g., Scale 1-10; mild, moderate, or severe)    - MILD (1-3): doesn't interfere with normal activities, abdomen soft and not tender to touch     - MODERATE (4-7): interferes with normal activities or awakens from sleep, tender to touch     - SEVERE (8-10): excruciating pain, doubled over, unable to do any normal activities      Denies    7. CAUSE: \"What do you think is causing the discharge?\"      Unknown    8. OTHER SYMPTOMS: \"Do you have any other symptoms?\" (e.g., fever, itching, vaginal bleeding, pain with urination)      Itching    9. HAYLEY: \"What date are you expecting to deliver?\"       05/12/2024    10. PREGNANCY: \"How " "many weeks pregnant are you?\"        31w1d    Protocols used: Pregnancy - Vaginal Discharge-ADULT-[place]    "

## 2024-03-12 ENCOUNTER — ULTRASOUND (OUTPATIENT)
Dept: PERINATAL CARE | Facility: OTHER | Age: 39
End: 2024-03-12
Payer: COMMERCIAL

## 2024-03-12 VITALS
DIASTOLIC BLOOD PRESSURE: 62 MMHG | SYSTOLIC BLOOD PRESSURE: 120 MMHG | WEIGHT: 213 LBS | BODY MASS INDEX: 33.43 KG/M2 | HEART RATE: 94 BPM | HEIGHT: 67 IN

## 2024-03-12 DIAGNOSIS — O43.193 MARGINAL INSERTION OF UMBILICAL CORD AFFECTING MANAGEMENT OF MOTHER IN THIRD TRIMESTER: Primary | ICD-10-CM

## 2024-03-12 DIAGNOSIS — O09.813 PREGNANCY RESULTING FROM IN VITRO FERTILIZATION IN THIRD TRIMESTER: ICD-10-CM

## 2024-03-12 DIAGNOSIS — Z3A.31 31 WEEKS GESTATION OF PREGNANCY: ICD-10-CM

## 2024-03-12 DIAGNOSIS — Z36.89 ENCOUNTER FOR ULTRASOUND TO CHECK FETAL GROWTH: ICD-10-CM

## 2024-03-12 DIAGNOSIS — O09.523 MULTIGRAVIDA OF ADVANCED MATERNAL AGE IN THIRD TRIMESTER: ICD-10-CM

## 2024-03-12 PROCEDURE — 76816 OB US FOLLOW-UP PER FETUS: CPT | Performed by: STUDENT IN AN ORGANIZED HEALTH CARE EDUCATION/TRAINING PROGRAM

## 2024-03-12 NOTE — PROGRESS NOTES
"[facility]: Ms. Morris was seen today for fetal growth assessment ultrasound.  See ultrasound report under \"OB Procedures\" tab.   Please don't hesitate to contact our office with any concerns or questions.  -Sherrell Fernandez MD    "

## 2024-03-19 PROBLEM — Z3A.32 32 WEEKS GESTATION OF PREGNANCY: Status: ACTIVE | Noted: 2023-11-16

## 2024-03-19 PROBLEM — O99.013 ANEMIA DURING PREGNANCY IN THIRD TRIMESTER: Status: ACTIVE | Noted: 2024-03-19

## 2024-03-20 ENCOUNTER — ROUTINE PRENATAL (OUTPATIENT)
Dept: OBGYN CLINIC | Facility: MEDICAL CENTER | Age: 39
End: 2024-03-20

## 2024-03-20 VITALS — SYSTOLIC BLOOD PRESSURE: 118 MMHG | WEIGHT: 213.4 LBS | DIASTOLIC BLOOD PRESSURE: 70 MMHG | BODY MASS INDEX: 33.42 KG/M2

## 2024-03-20 DIAGNOSIS — O43.193 MARGINAL INSERTION OF UMBILICAL CORD AFFECTING MANAGEMENT OF MOTHER IN THIRD TRIMESTER: ICD-10-CM

## 2024-03-20 DIAGNOSIS — O99.013 ANEMIA DURING PREGNANCY IN THIRD TRIMESTER: ICD-10-CM

## 2024-03-20 DIAGNOSIS — O09.813 PREGNANCY RESULTING FROM IN VITRO FERTILIZATION IN THIRD TRIMESTER: ICD-10-CM

## 2024-03-20 DIAGNOSIS — Z3A.32 32 WEEKS GESTATION OF PREGNANCY: Primary | ICD-10-CM

## 2024-03-20 DIAGNOSIS — O09.523 MULTIGRAVIDA OF ADVANCED MATERNAL AGE IN THIRD TRIMESTER: ICD-10-CM

## 2024-03-20 PROCEDURE — PNV: Performed by: OBSTETRICS & GYNECOLOGY

## 2024-03-20 NOTE — PROGRESS NOTES
Routine Prenatal Visit  [facility] of [address], [place], [place]    Assessment/Plan:  Ama is a 38 y.o. year old  at 32w2d who presents for routine prenatal visit.     1. 32 weeks gestation of pregnancy  Assessment & Plan:  Growth on 3/12- normal         2. Marginal insertion of umbilical cord affecting management of mother in third trimester  Assessment & Plan:  Last sonogram reviewed and placenta is normal       3. Multigravida of advanced maternal age in third trimester  Assessment & Plan:  Testing is normal       4. Pregnancy resulting from in vitro fertilization in third trimester  Assessment & Plan:  Echo - normal   Growth 3rd trimester was normal       5. Anemia during pregnancy in third trimester  Assessment & Plan:  Lab Results   Component Value Date    WBC 11.88 (H) 2024    HGB 10.5 (L) 2024    HCT 33.6 (L) 2024    MCV 80 (L) 2024     (H) 2024       Currently on po iron   Will repeat the labs  if low consider infusion     Orders:  -     CBC and differential; Future; Expected date: 2024          Subjective:     CC: Prenatal care    Ama Morris is a 38 y.o.  female who presents for routine prenatal care at 32w2d.  Pregnancy ROS: no leakage of fluid, pelvic pain, or vaginal bleeding.  yes fetal movement.    The following portions of the patient's history were reviewed and updated as appropriate: allergies, current medications, past family history, past medical history, obstetric history, gynecologic history, past social history, past surgical history and problem list.      Objective:  /70   Wt 96.8 kg (213 lb 6.4 oz)   LMP 2023   BMI 33.42 kg/m²   Pregravid Weight/BMI: 80.7 kg (178 lb) (BMI 27.87)  Current Weight: 96.8 kg (213 lb 6.4 oz)   Total Weight Gain: 16.1 kg (35 lb 6.4 oz)   Pre-Tejas Vitals      Flowsheet Row Most Recent Value   Prenatal Assessment    Fetal Heart Rate 154    Movement Present   Prenatal Vitals    Blood Pressure 118/70   Weight - Scale 96.8 kg (213 lb 6.4 oz)   Urine Albumin/Glucose    Dilation/Effacement/Station    Vaginal Drainage    Draining Fluid No   Edema              General: Well appearing, no distress  Respiratory: Unlabored breathing  Cardiovascular: Regular rate.  Abdomen: Soft, gravid, nontender  Fundal Height: Appropriate for gestational age.  Extremities: Warm and well perfused.  Non tender.

## 2024-03-20 NOTE — ASSESSMENT & PLAN NOTE
Lab Results   Component Value Date    WBC 11.88 (H) 02/05/2024    HGB 10.5 (L) 02/05/2024    HCT 33.6 (L) 02/05/2024    MCV 80 (L) 02/05/2024     (H) 02/05/2024       Currently on po iron   Will repeat the labs April 1 if low consider infusion

## 2024-03-26 ENCOUNTER — TELEPHONE (OUTPATIENT)
Dept: OBGYN CLINIC | Facility: CLINIC | Age: 39
End: 2024-03-26

## 2024-03-26 NOTE — TELEPHONE ENCOUNTER
Left voicemail for patient to call office to reschedule OB appointment and NST she had scheduled on 4/2 with Dr. Link in the [place] office.

## 2024-03-29 ENCOUNTER — ROUTINE PRENATAL (OUTPATIENT)
Dept: OBGYN CLINIC | Facility: MEDICAL CENTER | Age: 39
End: 2024-03-29

## 2024-03-29 VITALS — WEIGHT: 216 LBS | BODY MASS INDEX: 33.83 KG/M2 | DIASTOLIC BLOOD PRESSURE: 70 MMHG | SYSTOLIC BLOOD PRESSURE: 120 MMHG

## 2024-03-29 DIAGNOSIS — Z3A.33 33 WEEKS GESTATION OF PREGNANCY: ICD-10-CM

## 2024-03-29 DIAGNOSIS — O43.193 MARGINAL INSERTION OF UMBILICAL CORD AFFECTING MANAGEMENT OF MOTHER IN THIRD TRIMESTER: Primary | ICD-10-CM

## 2024-03-29 DIAGNOSIS — O09.523 MULTIGRAVIDA OF ADVANCED MATERNAL AGE IN THIRD TRIMESTER: ICD-10-CM

## 2024-03-29 DIAGNOSIS — O09.813 PREGNANCY RESULTING FROM IN VITRO FERTILIZATION IN THIRD TRIMESTER: ICD-10-CM

## 2024-03-29 DIAGNOSIS — O99.013 ANEMIA DURING PREGNANCY IN THIRD TRIMESTER: ICD-10-CM

## 2024-03-29 PROCEDURE — PNV: Performed by: NURSE PRACTITIONER

## 2024-03-29 NOTE — PROGRESS NOTES
Denies loss of fluid, vaginal bleeding and abdominal pain.  Confirms frequent fetal movement.  Doing fetal kick counts.  Tolerating low-dose aspirin, prenatal vitamin and iron well.  Has repeat CBC planned for Monday.  Denies questions or concerns at today's visit  BP: 120/70 weight:+38lbs  Plan  -Continue prenatal vitamins and low-dose aspirin daily  -Continue fetal kick counts daily  -Anemia in pregnancy encouraged to continue iron as scheduled  -Common discomforts of pregnancy and precautions including  labor reviewed.  Signs and symptoms to report reviewed.  RTO 2 weeks

## 2024-04-02 ENCOUNTER — APPOINTMENT (OUTPATIENT)
Dept: LAB | Facility: MEDICAL CENTER | Age: 39
End: 2024-04-02
Payer: COMMERCIAL

## 2024-04-02 DIAGNOSIS — O99.013 ANEMIA DURING PREGNANCY IN THIRD TRIMESTER: ICD-10-CM

## 2024-04-02 LAB
BASOPHILS # BLD AUTO: 0.08 THOUSANDS/ÂΜL (ref 0–0.1)
BASOPHILS NFR BLD AUTO: 1 % (ref 0–1)
EOSINOPHIL # BLD AUTO: 0.08 THOUSAND/ÂΜL (ref 0–0.61)
EOSINOPHIL NFR BLD AUTO: 1 % (ref 0–6)
ERYTHROCYTE [DISTWIDTH] IN BLOOD BY AUTOMATED COUNT: 14.4 % (ref 11.6–15.1)
HCT VFR BLD AUTO: 36.2 % (ref 34.8–46.1)
HGB BLD-MCNC: 11 G/DL (ref 11.5–15.4)
IMM GRANULOCYTES # BLD AUTO: 0.31 THOUSAND/UL (ref 0–0.2)
IMM GRANULOCYTES NFR BLD AUTO: 2 % (ref 0–2)
LYMPHOCYTES # BLD AUTO: 2.18 THOUSANDS/ÂΜL (ref 0.6–4.47)
LYMPHOCYTES NFR BLD AUTO: 17 % (ref 14–44)
MCH RBC QN AUTO: 24.5 PG (ref 26.8–34.3)
MCHC RBC AUTO-ENTMCNC: 30.4 G/DL (ref 31.4–37.4)
MCV RBC AUTO: 81 FL (ref 82–98)
MONOCYTES # BLD AUTO: 0.97 THOUSAND/ÂΜL (ref 0.17–1.22)
MONOCYTES NFR BLD AUTO: 7 % (ref 4–12)
NEUTROPHILS # BLD AUTO: 9.43 THOUSANDS/ÂΜL (ref 1.85–7.62)
NEUTS SEG NFR BLD AUTO: 72 % (ref 43–75)
NRBC BLD AUTO-RTO: 0 /100 WBCS
PLATELET # BLD AUTO: 365 THOUSANDS/UL (ref 149–390)
PMV BLD AUTO: 9 FL (ref 8.9–12.7)
RBC # BLD AUTO: 4.49 MILLION/UL (ref 3.81–5.12)
WBC # BLD AUTO: 13.05 THOUSAND/UL (ref 4.31–10.16)

## 2024-04-02 PROCEDURE — 36415 COLL VENOUS BLD VENIPUNCTURE: CPT

## 2024-04-02 PROCEDURE — 85025 COMPLETE CBC W/AUTO DIFF WBC: CPT

## 2024-04-17 NOTE — PROGRESS NOTES
Routine Prenatal Visit  [facility] [address], [place], [place]    Assessment/Plan:  Ama is a 38 y.o. year old  at 36w3d who presents for routine prenatal visit.     1. Multigravida of advanced maternal age in third trimester    2. Third trimester pregnancy  -     Strep B DNA probe, amplification    3. Breech presentation, single or unspecified fetus  Assessment & Plan:  -Discussed options including ECV versus scheduled primary .  Patient decided ECV will schedule between 37-38 weeks.  Risk of rupture, placental abruption,  labor reviewed.      4. 36 weeks gestation of pregnancy  Assessment & Plan:  GBS done today      5. Pregnancy resulting from in vitro fertilization in third trimester  Assessment & Plan:  APFS, starting at 36 weeks            Subjective:     CC: Prenatal care    Ama Morris is a 38 y.o.  female who presents for routine prenatal care at 36w3d.  Pregnancy ROS: no leakage of fluid, pelvic pain, or vaginal bleeding.  good fetal movement.    The following portions of the patient's history were reviewed and updated as appropriate: allergies, current medications, past family history, past medical history, obstetric history, gynecologic history, past social history, past surgical history and problem list.      Objective:  /80   Wt 100 kg (221 lb 3.2 oz)   LMP 2023   BMI 34.64 kg/m²   Pregravid Weight/BMI: 80.7 kg (178 lb) (BMI 27.87)  Current Weight: 100 kg (221 lb 3.2 oz)   Total Weight Gain: 19.6 kg (43 lb 3.2 oz)   Pre-Tejas Vitals      Flowsheet Row Most Recent Value   Prenatal Assessment    Fetal Heart Rate 145   Movement Present   Prenatal Vitals    Blood Pressure 110/80   Weight - Scale 100 kg (221 lb 3.2 oz)   Urine Albumin/Glucose    Dilation/Effacement/Station    Vaginal Drainage    Draining Fluid No   Edema    LLE Edema None             General: Well appearing, no distress  Respiratory: Unlabored  breathing  Cardiovascular: Regular rate.  Abdomen: Soft, gravid, nontender  Fundal Height: Appropriate for gestational age.  Extremities: Warm and well perfused.  Non tender.

## 2024-04-18 ENCOUNTER — ROUTINE PRENATAL (OUTPATIENT)
Dept: OBGYN CLINIC | Facility: MEDICAL CENTER | Age: 39
End: 2024-04-18
Payer: COMMERCIAL

## 2024-04-18 ENCOUNTER — TELEPHONE (OUTPATIENT)
Dept: OBGYN CLINIC | Facility: MEDICAL CENTER | Age: 39
End: 2024-04-18

## 2024-04-18 VITALS — WEIGHT: 221.2 LBS | BODY MASS INDEX: 34.64 KG/M2 | SYSTOLIC BLOOD PRESSURE: 110 MMHG | DIASTOLIC BLOOD PRESSURE: 80 MMHG

## 2024-04-18 DIAGNOSIS — O09.523 MULTIGRAVIDA OF ADVANCED MATERNAL AGE IN THIRD TRIMESTER: Primary | ICD-10-CM

## 2024-04-18 DIAGNOSIS — Z3A.36 36 WEEKS GESTATION OF PREGNANCY: ICD-10-CM

## 2024-04-18 DIAGNOSIS — O09.813 PREGNANCY RESULTING FROM IN VITRO FERTILIZATION IN THIRD TRIMESTER: ICD-10-CM

## 2024-04-18 DIAGNOSIS — Z34.93 THIRD TRIMESTER PREGNANCY: ICD-10-CM

## 2024-04-18 DIAGNOSIS — O09.813 PREGNANCY RESULTING FROM IN VITRO FERTILIZATION IN THIRD TRIMESTER: Primary | ICD-10-CM

## 2024-04-18 PROCEDURE — 59025 FETAL NON-STRESS TEST: CPT | Performed by: OBSTETRICS & GYNECOLOGY

## 2024-04-18 PROCEDURE — 76815 OB US LIMITED FETUS(S): CPT | Performed by: OBSTETRICS & GYNECOLOGY

## 2024-04-18 PROCEDURE — 87150 DNA/RNA AMPLIFIED PROBE: CPT | Performed by: OBSTETRICS & GYNECOLOGY

## 2024-04-18 PROCEDURE — PNV: Performed by: OBSTETRICS & GYNECOLOGY

## 2024-04-18 NOTE — ASSESSMENT & PLAN NOTE
-Discussed options including ECV versus scheduled primary .  Patient decided ECV will schedule between 37-38 weeks.  Risk of rupture, placental abruption,  labor reviewed.

## 2024-04-18 NOTE — PROGRESS NOTES
04/18/24 1229   Nonstress Test   Reason for NST IVF   Variability 6-25 BPM   Decelerations None   Accelerations Yes   Acoustic Stimulator Yes   Performed First   Baby's Response (1) Accel > or equal to 15 BPM   Baseline 145 BPM   YULI (If Indicated) (cm) 16.9 cm   Uterine Irritability No   Contractions Not present   Interpretation   Nonstress Test Interpretation Reactive   Overall Impression Reassuring

## 2024-04-18 NOTE — TELEPHONE ENCOUNTER
Spoke w/pt, reviewed date /time for ECV. Scheduled at [facility], 4/26/24 at noon, verbalized understanding    ----- Message from Katie Sánchez MD sent at 4/18/2024 10:51 AM EDT -----  Please schedule for ECV between 37-38 weeks (next week)

## 2024-04-18 NOTE — PATIENT INSTRUCTIONS
Fetal Movement   WHAT YOU NEED TO KNOW:   Fetal movements are the kicks, rolls, and hiccups of your unborn baby. You may start to feel these movements when you are 20 weeks pregnant. The movements grow stronger and more frequent as your baby grows. Fetal movements show that your unborn baby is getting the oxygen and nutrients he or she needs before birth. Fewer fetal movements may signal a problem with your baby's health.  DISCHARGE INSTRUCTIONS:   Follow up with your doctor or obstetrician as directed:  Write down your questions so you remember to ask them during your visits.  Normal fetal movement:  Fetal activity can be described by 4 states, from least to most active. During quiet sleep, your unborn baby may be still for up to 2 hours. During active sleep, he or she kicks, rolls, and moves often. During the quiet awake state, he or she may only move his or her eyes. The active awake state includes strong kicks and rolls.  What affects fetal movement:  You may feel your baby move more after you eat, or after you drink caffeine. You may feel your baby move less while you are more active, such as when you exercise. You may also feel fewer movements if you are obese. Certain medicines can change your baby's movements. Tell your healthcare provider about the medicines you are taking.  Track fetal movements at home:  Fetal movement is most often felt when you lie quietly on your side. Your healthcare provider may ask you to count movements for 2 hours. He or she may ask you to track how long it takes for your baby to move 10 times. Keep a log of your baby's movements.  Contact your doctor or obstetrician if:   It takes longer than usual to feel 10 of your unborn baby's movements.    You do not feel your unborn baby move at least 10 times in 2 hours.    The skin on your hands, feet, and around your eyes is more swollen than usual.    You have a headache for at least 24 hours.    Tiny red dots appear on your  skin.    Your belly is tender when you press on it.    You have questions or concerns about your condition or care.    Return to the emergency department if:   You do not feel your unborn baby move for 12 hours.    You feel cramping or constant pain in your abdomen.    You have heavy bleeding from your vagina.    You have a severe headache and cannot see clearly.    You are having trouble breathing or are vomiting.    You have a seizure.    © Copyright [employer] 2023 Information is for End User's use only and may not be sold, redistributed or otherwise used for commercial purposes.  The above information is an  only. It is not intended as medical advice for individual conditions or treatments. Talk to your doctor, nurse or pharmacist before following any medical regimen to see if it is safe and effective for you.  Early Labor Signs   WHAT YOU NEED TO KNOW:   Early labor signs can happen weeks, days, or hours before your baby is ready to be delivered. You may have false labor signs, which are also called Harjeet Rowell contractions. False labor is common and may happen several weeks or days before your actual labor. The contractions are not regular, and do not get closer together. The pain is usually mild, does not worsen, and is felt only in front. Harjeet Rowell contractions may happen later in the day, and stop after you change position, walk, or rest.  DISCHARGE INSTRUCTIONS:   Call 911 for any of the following:   You have heavy vaginal bleeding.    You cannot get to the hospital before the baby starts to come out.    Return to the emergency department if:   You have regular, painful contractions that are less than 5 minutes apart and last 30 to 70 seconds each.    You have a constant trickle or sudden gush of clear fluid from your vagina.    You notice a sudden decrease in your baby's movement.    Contact your obstetrician or healthcare provider if:   You have pain in your lower back or abdomen that  does not get better when you change positions.    You have bloody mucus or show.    You have questions or concerns about your condition or care.    Early Labor signs and symptoms:   Lightening  occurs when your baby drops inside your pelvis. You may feel increased pressure in your pelvis. This may happen a few weeks to a few hours before your labor begins.    Contractions  are cramps and tightening that occur in your uterus to help move the baby through your birth canal. Contractions occur regularly and more often each time. Each one lasts about 30 to 70 seconds, and gets stronger until you deliver your baby. Contractions do not go away with movement. The pain usually starts in your lower back and moves to your abdomen.     Effacement  occurs when your cervix softens and thins, so it can easily open for the baby. You will not be able to feel effacement. Your healthcare provider will examine your cervix for effacement.     Dilation  is widening of your cervix. Your healthcare provider will examine your cervix for dilation. Your cervix may start to dilate weeks before your baby is delivered. Your cervix will be fully opened and ready for delivery when it is dilated to 10 centimeters.     Increased discharge  from your vagina may occur. It may be brown, pink, clear, or slightly bloody. This discharge may also be called bloody show. Bloody show is a mucus plug that forms and blocks your cervix during pregnancy. The discharge may mean that your cervix is opening up and getting ready for delivery.    Rupture of membranes  is a sudden release of clear fluid from your vagina. Ruptured membranes means your water broke. Your healthcare provider may need to break your water if it does not happen on its own.    © Copyright [employer] 2023 Information is for End User's use only and may not be sold, redistributed or otherwise used for commercial purposes.  The above information is an  only. It is not intended as  medical advice for individual conditions or treatments. Talk to your doctor, nurse or pharmacist before following any medical regimen to see if it is safe and effective for you.

## 2024-04-21 LAB — GP B STREP DNA SPEC QL NAA+PROBE: NEGATIVE

## 2024-04-25 ENCOUNTER — ROUTINE PRENATAL (OUTPATIENT)
Dept: OBGYN CLINIC | Facility: MEDICAL CENTER | Age: 39
End: 2024-04-25
Payer: COMMERCIAL

## 2024-04-25 VITALS — BODY MASS INDEX: 35.08 KG/M2 | WEIGHT: 224 LBS | DIASTOLIC BLOOD PRESSURE: 78 MMHG | SYSTOLIC BLOOD PRESSURE: 115 MMHG

## 2024-04-25 DIAGNOSIS — O99.013 ANEMIA DURING PREGNANCY IN THIRD TRIMESTER: ICD-10-CM

## 2024-04-25 DIAGNOSIS — O43.193 MARGINAL INSERTION OF UMBILICAL CORD AFFECTING MANAGEMENT OF MOTHER IN THIRD TRIMESTER: Primary | ICD-10-CM

## 2024-04-25 DIAGNOSIS — O09.813 PREGNANCY RESULTING FROM IN VITRO FERTILIZATION IN THIRD TRIMESTER: ICD-10-CM

## 2024-04-25 PROBLEM — Z3A.37 37 WEEKS GESTATION OF PREGNANCY: Status: ACTIVE | Noted: 2023-11-16

## 2024-04-25 PROCEDURE — 59025 FETAL NON-STRESS TEST: CPT | Performed by: STUDENT IN AN ORGANIZED HEALTH CARE EDUCATION/TRAINING PROGRAM

## 2024-04-25 PROCEDURE — PNV: Performed by: STUDENT IN AN ORGANIZED HEALTH CARE EDUCATION/TRAINING PROGRAM

## 2024-04-25 PROCEDURE — 76815 OB US LIMITED FETUS(S): CPT | Performed by: STUDENT IN AN ORGANIZED HEALTH CARE EDUCATION/TRAINING PROGRAM

## 2024-04-25 NOTE — PROGRESS NOTES
Routine Prenatal Visit  [facility] [address], [place], [place]    Assessment/Plan:  Ama is a 38 y.o. year old  at 37w4d who presents for routine prenatal visit.     1. Marginal insertion of umbilical cord affecting management of mother in third trimester    2. Pregnancy resulting from in vitro fertilization in third trimester    3. Anemia during pregnancy in third trimester    4. Breech presentation, single or unspecified fetus  Assessment & Plan:  - Confirmed breech again today on US  -  Discussed management options for persistent breech presentation, including planned  delivery, external cephalic version, and vaginal breech delivery.  Discussed risks and benefits of each option.  - Discussed with patient the method and success rates with external cephalic version.  - Discussed that version is made more difficult with obese women, large babies, anterior placentas, and breech engaged in the pelvis.   - Discussed that version carries a risk of fetal intolerance and urgent  delivery during the procedure.   - The patient does not have contraindications to planned ECV. Pros and cons of regional anesthesia use were reviewed.   - Plan: ECV  as previously planned  - Anesthesia?: epidural   Terbutaline? yes              Subjective:     CC: Prenatal care    Ama Morris is a 38 y.o.  female who presents for routine prenatal care at 37w4d.  Pregnancy ROS: Denies leakage of fluid, pelvic pain, or vaginal bleeding.  Reports fetal movement.    The following portions of the patient's history were reviewed and updated as appropriate: allergies, current medications, past family history, past medical history, obstetric history, gynecologic history, past social history, past surgical history and problem list.      Objective:  /78   Wt 102 kg (224 lb)   LMP 2023   BMI 35.08 kg/m²   Pregravid Weight/BMI: 80.7 kg (178 lb) (BMI 27.87)  Current  Weight: 102 kg (224 lb)   Total Weight Gain: 20.9 kg (46 lb)   Pre- Vitals      Flowsheet Row Most Recent Value   Prenatal Assessment    Fetal Heart Rate 148   Prenatal Vitals    Blood Pressure 115/78   Weight - Scale 102 kg (224 lb)   Urine Albumin/Glucose    Dilation/Effacement/Station    Vaginal Drainage    Draining Fluid No   Edema    LLE Edema Trace   RLE Edema Trace   Facial Edema None             General: Well appearing, no distress  Respiratory: Unlabored breathing  Cardiovascular: Regular rate.  Abdomen: Soft, gravid, nontender  Fundal Height: Appropriate for gestational age.  Extremities: Warm and well perfused.  Non tender.    Fetal NST  Baseline: 150 BPM  Accelerations: Yes  Decelerations: None  Uterine Irritability: No  Contractions: Irregular  Contraction Frequency (minutes): 3 min  Impression: Reactive    Jacquie Mckenzie MD  [facility]  [facility]  2024 11:17 AM

## 2024-04-25 NOTE — ASSESSMENT & PLAN NOTE
- Confirmed breech again today on US  -  Discussed management options for persistent breech presentation, including planned  delivery, external cephalic version, and vaginal breech delivery.  Discussed risks and benefits of each option.  - Discussed with patient the method and success rates with external cephalic version.  - Discussed that version is made more difficult with obese women, large babies, anterior placentas, and breech engaged in the pelvis.   - Discussed that version carries a risk of fetal intolerance and urgent  delivery during the procedure.   - The patient does not have contraindications to planned ECV. Pros and cons of regional anesthesia use were reviewed.   - Plan: ECV  as previously planned  - Anesthesia?: epidural   Terbutaline? yes

## 2024-04-26 ENCOUNTER — HOSPITAL ENCOUNTER (OUTPATIENT)
Dept: LABOR AND DELIVERY | Facility: HOSPITAL | Age: 39
End: 2024-04-26
Payer: COMMERCIAL

## 2024-04-26 ENCOUNTER — ANESTHESIA EVENT (OUTPATIENT)
Dept: LABOR AND DELIVERY | Facility: HOSPITAL | Age: 39
End: 2024-04-26
Payer: COMMERCIAL

## 2024-04-26 ENCOUNTER — ANESTHESIA (OUTPATIENT)
Dept: LABOR AND DELIVERY | Facility: HOSPITAL | Age: 39
End: 2024-04-26
Payer: COMMERCIAL

## 2024-04-26 ENCOUNTER — HOSPITAL ENCOUNTER (OUTPATIENT)
Facility: HOSPITAL | Age: 39
Discharge: HOME | End: 2024-04-26
Attending: OBSTETRICS & GYNECOLOGY | Admitting: OBSTETRICS & GYNECOLOGY
Payer: COMMERCIAL

## 2024-04-26 VITALS
SYSTOLIC BLOOD PRESSURE: 122 MMHG | RESPIRATION RATE: 18 BRPM | HEART RATE: 93 BPM | TEMPERATURE: 97.6 F | DIASTOLIC BLOOD PRESSURE: 72 MMHG

## 2024-04-26 LAB
ABO GROUP BLD: NORMAL
BLD GP AB SCN SERPL QL: NEGATIVE
ERYTHROCYTE [DISTWIDTH] IN BLOOD BY AUTOMATED COUNT: 14.5 % (ref 11.6–15.1)
HCT VFR BLD AUTO: 36.3 % (ref 34.8–46.1)
HGB BLD-MCNC: 11.5 G/DL (ref 11.5–15.4)
MCH RBC QN AUTO: 24.3 PG (ref 26.8–34.3)
MCHC RBC AUTO-ENTMCNC: 31.7 G/DL (ref 31.4–37.4)
MCV RBC AUTO: 77 FL (ref 82–98)
PLATELET # BLD AUTO: 353 THOUSANDS/UL (ref 149–390)
PMV BLD AUTO: 9.1 FL (ref 8.9–12.7)
RBC # BLD AUTO: 4.73 MILLION/UL (ref 3.81–5.12)
RH BLD: POSITIVE
SPECIMEN EXPIRATION DATE: NORMAL
WBC # BLD AUTO: 11.64 THOUSAND/UL (ref 4.31–10.16)

## 2024-04-26 PROCEDURE — 86850 RBC ANTIBODY SCREEN: CPT | Performed by: OBSTETRICS & GYNECOLOGY

## 2024-04-26 PROCEDURE — NC001 PR NO CHARGE: Performed by: OBSTETRICS & GYNECOLOGY

## 2024-04-26 PROCEDURE — 59412 ANTEPARTUM MANIPULATION: CPT | Performed by: OBSTETRICS & GYNECOLOGY

## 2024-04-26 PROCEDURE — 59412 ANTEPARTUM MANIPULATION: CPT

## 2024-04-26 PROCEDURE — 99205 OFFICE O/P NEW HI 60 MIN: CPT

## 2024-04-26 PROCEDURE — 86780 TREPONEMA PALLIDUM: CPT | Performed by: OBSTETRICS & GYNECOLOGY

## 2024-04-26 PROCEDURE — 86901 BLOOD TYPING SEROLOGIC RH(D): CPT | Performed by: OBSTETRICS & GYNECOLOGY

## 2024-04-26 PROCEDURE — 85027 COMPLETE CBC AUTOMATED: CPT | Performed by: OBSTETRICS & GYNECOLOGY

## 2024-04-26 PROCEDURE — 86900 BLOOD TYPING SEROLOGIC ABO: CPT | Performed by: OBSTETRICS & GYNECOLOGY

## 2024-04-26 RX ORDER — PHENYLEPHRINE HCL IN 0.9% NACL 1 MG/10 ML
SYRINGE (ML) INTRAVENOUS AS NEEDED
Status: DISCONTINUED | OUTPATIENT
Start: 2024-04-26 | End: 2024-04-26

## 2024-04-26 RX ORDER — CHLOROPROCAINE HYDROCHLORIDE 30 MG/ML
INJECTION, SOLUTION EPIDURAL; INFILTRATION; INTRACAUDAL; PERINEURAL AS NEEDED
Status: DISCONTINUED | OUTPATIENT
Start: 2024-04-26 | End: 2024-04-26

## 2024-04-26 RX ORDER — TERBUTALINE SULFATE 1 MG/ML
0.25 INJECTION, SOLUTION SUBCUTANEOUS ONCE
Status: COMPLETED | OUTPATIENT
Start: 2024-04-26 | End: 2024-04-26

## 2024-04-26 RX ORDER — LIDOCAINE HYDROCHLORIDE AND EPINEPHRINE 15; 5 MG/ML; UG/ML
INJECTION, SOLUTION EPIDURAL
Status: COMPLETED | OUTPATIENT
Start: 2024-04-26 | End: 2024-04-26

## 2024-04-26 RX ORDER — SODIUM CHLORIDE, SODIUM LACTATE, POTASSIUM CHLORIDE, CALCIUM CHLORIDE 600; 310; 30; 20 MG/100ML; MG/100ML; MG/100ML; MG/100ML
125 INJECTION, SOLUTION INTRAVENOUS CONTINUOUS
Status: DISCONTINUED | OUTPATIENT
Start: 2024-04-26 | End: 2024-04-26 | Stop reason: HOSPADM

## 2024-04-26 RX ADMIN — SODIUM CHLORIDE, SODIUM LACTATE, POTASSIUM CHLORIDE, AND CALCIUM CHLORIDE 1000 ML: .6; .31; .03; .02 INJECTION, SOLUTION INTRAVENOUS at 13:34

## 2024-04-26 RX ADMIN — SODIUM CHLORIDE, SODIUM LACTATE, POTASSIUM CHLORIDE, AND CALCIUM CHLORIDE 125 ML/HR: .6; .31; .03; .02 INJECTION, SOLUTION INTRAVENOUS at 16:31

## 2024-04-26 RX ADMIN — LIDOCAINE HYDROCHLORIDE AND EPINEPHRINE 3 ML: 15; 5 INJECTION, SOLUTION EPIDURAL at 15:35

## 2024-04-26 RX ADMIN — SODIUM CHLORIDE, SODIUM LACTATE, POTASSIUM CHLORIDE, AND CALCIUM CHLORIDE 999 ML/HR: .6; .31; .03; .02 INJECTION, SOLUTION INTRAVENOUS at 15:31

## 2024-04-26 RX ADMIN — Medication 100 MCG: at 16:34

## 2024-04-26 RX ADMIN — TERBUTALINE SULFATE 0.25 MG: 1 INJECTION SUBCUTANEOUS at 16:20

## 2024-04-26 RX ADMIN — Medication 100 MCG: at 16:17

## 2024-04-26 RX ADMIN — CHLOROPROCAINE HYDROCHLORIDE 4 ML: 30 INJECTION, SOLUTION EPIDURAL; INFILTRATION; INTRACAUDAL; PERINEURAL at 16:00

## 2024-04-26 RX ADMIN — Medication 100 MCG: at 16:09

## 2024-04-26 RX ADMIN — CHLOROPROCAINE HYDROCHLORIDE 4 ML: 30 INJECTION, SOLUTION EPIDURAL; INFILTRATION; INTRACAUDAL; PERINEURAL at 16:03

## 2024-04-26 NOTE — H&P
H & P- Obstetrics   Ama Morris 38 y.o. female MRN: [id number]  Unit/Bed#: -01 Encounter: [id number]      Assessment/Plan:    Ama is a 38 y.o.  at 37w5d admitted for ECV    SVE: Deferred    37 weeks gestation of pregnancy  Assessment & Plan  Admit to OB/GYN service  Follow up CBC, RPR, Blood Type  EFW: 50%  Anticipate same day discharge        * Breech presentation  Assessment & Plan  Patient has decided to proceed with ECV for breech presentation. She understands that if baby does not flip to VTX that would require a primary low-transverse  delivery at 39 weeks.  We discussed use of terbutaline and epidural to increase success rates.  We discussed that if fetal intolerance is encountered, we will proceed with primary low-transverse  delivery today. Risk of SROM, placental abruption,  labor were discussed.       Patient of: [facility]  This patient will be an OUTPATIENT NO CHARGE BED and I certify the anticipated length of stay is <2 Midnights  Discussed with Dr. Link      SUBJECTIVE:    HPI: Ama Morris is a 38 y.o.  with an HAYLEY of 2024, by Embryo Transfer at 37w5d who is being admitted for ECV.        Pregnancy Plan:  Pregnancy: Kyle  Fetal sex: Female  Support person: Pravin     Delivery Plans  Planned delivery method: Vaginal  Planned delivery location: [facility]  Planned anesthesia: Epidural  Acceptable blood products: All     Post-Delivery Plans  Feeding intentions: Non-human milk substitute  Planned birth control: Male Sterilization      Patient Active Problem List   Diagnosis    Multigravida of advanced maternal age in third trimester    Pregnancy resulting from in vitro fertilization in third trimester    37 weeks gestation of pregnancy    Marginal insertion of umbilical cord affecting management of mother in third trimester    Anemia during pregnancy in third trimester    Breech presentation       OB History    Para Term  " AB Living   2 1 1     1   SAB IAB Ectopic Multiple Live Births           1      # Outcome Date GA Lbr Aniket/2nd Weight Sex Delivery Anes PTL Lv   2 Current            1 Term 19 40w0d  3459 g (7 lb 10 oz) F Vag-Spont  N PIPE       Past Medical History:   Diagnosis Date    Abnormal Pap smear of cervix        Past Surgical History:   Procedure Laterality Date    LASIK         Social History     Tobacco Use    Smoking status: Never     Passive exposure: Never    Smokeless tobacco: Never   Substance Use Topics    Alcohol use: Not Currently     Comment: socially       No Known Allergies    Medications Prior to Admission   Medication    Ferrous Sulfate (Iron) 325 (65 Fe) MG TABS    Prenatal Vit-Fe Fumarate-FA (PRENATAL VITAMIN AND MINERAL PO)    Doxylamine Succinate, Sleep, (UNISOM PO)    Pyridoxine HCl (vitamin B-6) 25 MG tablet           OBJECTIVE:  Vitals:     There is no height or weight on file to calculate BMI.     Physical Exam:  General: Well appearing, no distress  Respiratory: Unlabored breathing  Cardiovascular: Regular rate.  Abdomen: Soft, gravid, nontender  Fundal Height: Appropriate for gestational age.  Extremities: Warm and well perfused.  Non tender.  Psychiatric: Behavioral normal        FHT:   Baseline 145 BPM, moderate variability, accels present, no decels noted    TOCO:    Irritable        Sarahi Bucio,   2024  1:47 PM        Portions of the record may have been created with voice recognition software.  Occasional wrong word or \"sound a like\" substitutions may have occurred due to the inherent limitations of voice recognition software.  Read the chart carefully and recognize, using context, where substitutions have occurred    "

## 2024-04-26 NOTE — ASSESSMENT & PLAN NOTE
Patient has decided to proceed with ECV for breech presentation. She understands that if baby does not flip to VTX that would require a primary low-transverse  delivery at 39 weeks.  We discussed use of terbutaline and epidural to increase success rates.  We discussed that if fetal intolerance is encountered, we will proceed with primary low-transverse  delivery today. Risk of SROM, placental abruption,  labor were discussed.

## 2024-04-26 NOTE — PROGRESS NOTES
Patient feeling well post-procedure. Some discomfort with FM, but no distinct ctxns, leaking fluid, or bleeding. Ambulating and voiding without difficulty. Fetal monitoring is reassuring and tocometry is quiet.     Discharged to home with precautions.    1LTCS scheduled 5/6 at 0730am with Dr Sánchez    
Krystle Alvarado  (RN)  2019 23:05:00

## 2024-04-26 NOTE — ASSESSMENT & PLAN NOTE
Admit to OB/GYN service  Follow up CBC, RPR, Blood Type  EFW: 50%  Anticipate same day discharge

## 2024-04-26 NOTE — PROCEDURES
Ama Morris, marina  at 37w5d with an HAYLEY of 2024, by Embryo Transfer, was seen at [facility] for the following procedure(s):  ]                External cephalic version     Date/Time  2024 4:39 PM     Performed by  Sarahi Bucio DO   Authorized by  Sarahi Bucio DO     Universal Protocol   Consent: Written consent obtained.  Patient understanding: patient states understanding of the procedure being performed  Patient consent: the patient's understanding of the procedure matches consent given  Procedure consent: procedure consent matches procedure scheduled  Relevant documents: relevant documents present and verified  Patient identity confirmed: verbally with patient      Local anesthesia used: Epidural.     Anesthesia   Local anesthesia used: Epidural.     Sedation   Patient sedated: no       Procedure Details   Procedure Notes: Informed consent for external cephalic version was obtained.     Description of Procedure:    External fetal heart rate monitoring, external tocography, and a non-stress test were performed. Using bedside transabdominal ultrasound, an amniotic fluid index was measured.  Epidural  analgesia was established.     The patient was placed in supine position with a 10 degree vertical elevation. The tocolytic agent was administered. A bedside ultrasound revealed a single intrauterine pregnancy. The fetal heart rate was noted to be consistent with previous recorded baseline. Upon patient deep exhalation, manual pressure was applied to manipulate the fetus in a forward roll motion twice. This was unsuccessful. Then we attempted twice in a backward roll fashion. Fetal heart tones were checked intermittently during the procedure and were noted to be consistently reassuring.     Cephalic presentation was not achieved.    Following the procedure, the patient was placed on continuous external fetal monitoring. She will be scheduled for a 1LTCS  for breech presentation  Patient Transportation: confirmed  Patient tolerance: patient tolerated the procedure well with no immediate complications

## 2024-04-26 NOTE — ANESTHESIA POSTPROCEDURE EVALUATION
Post-Op Assessment Note    CV Status:  Stable    Pain management: adequate      Post-op block assessment: catheter intact and no complications   Mental Status:  Alert and awake   Hydration Status:  Euvolemic   PONV Controlled:  Controlled   Airway Patency:  Patent     Post Op Vitals Reviewed: Yes    No anethesia notable event occurred.    Staff: Anesthesiologist               BP      Temp      Pulse     Resp      SpO2      /65   Pulse 85   Temp 97.6 °F (36.4 °C) (Oral)   Resp 18   LMP 08/06/2023

## 2024-04-26 NOTE — ANESTHESIA PROCEDURE NOTES
Epidural Block    Patient location during procedure: OB/L&D  Start time: 4/26/2024 3:35 PM  Reason for block: at surgeon's request and primary anesthetic  Staffing  Performed by: Edward Chiu DO  Authorized by: Edward Chiu DO    Preanesthetic Checklist  Completed: patient identified, IV checked, risks and benefits discussed, surgical consent, monitors and equipment checked, pre-op evaluation and timeout performed  Epidural  Patient position: sitting  Prep: Betadine  Sedation Level: no sedation  Patient monitoring: frequent blood pressure checks, continuous pulse oximetry and heart rate  Approach: midline  Location: lumbar, L3-4  Injection technique: LANCE saline  Needle  Needle type: Tuohy   Needle gauge: 18 G  Needle insertion depth: 5 cm  Catheter type: multi-orifice  Catheter size: 20 G  Catheter at skin depth: 9 cm  Catheter securement method: stabilization device and clear occlusive dressing  Test dose: negativelidocaine-epinephrine (XYLOCAINE-MPF/EPINEPHRINE) 1.5 %-1:200,000 injection 3 mL - Epidural   3 mL - 4/26/2024 3:35:00 PM  Assessment  Sensory level: T10  Number of attempts: 1negative aspiration for CSF, negative aspiration for heme and no paresthesia on injection  patient tolerated the procedure well with no immediate complications

## 2024-04-27 LAB — TREPONEMA PALLIDUM IGG+IGM AB [PRESENCE] IN SERUM OR PLASMA BY IMMUNOASSAY: NORMAL

## 2024-05-02 ENCOUNTER — ROUTINE PRENATAL (OUTPATIENT)
Dept: OBGYN CLINIC | Facility: MEDICAL CENTER | Age: 39
End: 2024-05-02

## 2024-05-02 VITALS
DIASTOLIC BLOOD PRESSURE: 76 MMHG | WEIGHT: 224 LBS | BODY MASS INDEX: 35.16 KG/M2 | HEIGHT: 67 IN | SYSTOLIC BLOOD PRESSURE: 118 MMHG

## 2024-05-02 DIAGNOSIS — O43.193 MARGINAL INSERTION OF UMBILICAL CORD AFFECTING MANAGEMENT OF MOTHER IN THIRD TRIMESTER: Primary | ICD-10-CM

## 2024-05-02 DIAGNOSIS — O09.523 MULTIGRAVIDA OF ADVANCED MATERNAL AGE IN THIRD TRIMESTER: ICD-10-CM

## 2024-05-02 DIAGNOSIS — O09.813 PREGNANCY RESULTING FROM IN VITRO FERTILIZATION IN THIRD TRIMESTER: ICD-10-CM

## 2024-05-02 DIAGNOSIS — Z3A.37 37 WEEKS GESTATION OF PREGNANCY: ICD-10-CM

## 2024-05-02 PROCEDURE — PNV: Performed by: STUDENT IN AN ORGANIZED HEALTH CARE EDUCATION/TRAINING PROGRAM

## 2024-05-02 NOTE — PROGRESS NOTES
"Routine Prenatal Visit  [facility] [address], [place], [place]    Assessment/Plan:  Ama is a 39 y.o. year old  at 38w4d who presents for routine prenatal visit.     1. Marginal insertion of umbilical cord affecting management of mother in third trimester    2. Pregnancy resulting from in vitro fertilization in third trimester    3. Multigravida of advanced maternal age in third trimester    4. Breech presentation, single or unspecified fetus  Assessment & Plan:  - S/p Failed ECV  -  planned on , patient given hibiclens and preoperative instructions including SSI education      5. 37 weeks gestation of pregnancy  Assessment & Plan:  - GBS negative  - Delivery Plan: LTCS for breech on   - Feeding: Breastfeeding  - Recommended discontinuing aspirin at 36 weeks  - Perineal massage education reviewed  - Fetal kick counts and labor precautions reviewed.                Subjective:     CC: Prenatal care    Ama Morris is a 39 y.o.  female who presents for routine prenatal care at 38w4d.  Pregnancy ROS: Denies leakage of fluid, pelvic pain, or vaginal bleeding.  Reports fetal movement.    The following portions of the patient's history were reviewed and updated as appropriate: allergies, current medications, past family history, past medical history, obstetric history, gynecologic history, past social history, past surgical history and problem list.      Objective:  /76   Ht 5' 7\" (1.702 m)   Wt 102 kg (224 lb)   LMP 2023   BMI 35.08 kg/m²   Pregravid Weight/BMI: 80.7 kg (178 lb) (BMI 27.87)  Current Weight: 102 kg (224 lb)   Total Weight Gain: 20.9 kg (46 lb)   Pre-Tejas Vitals      Flowsheet Row Most Recent Value   Prenatal Assessment    Fetal Heart Rate 145   Movement Present   Prenatal Vitals    Blood Pressure 118/76   Weight - Scale 102 kg (224 lb)   Urine Albumin/Glucose    Dilation/Effacement/Station    Vaginal Drainage  "   Draining Fluid No   Edema    LLE Edema Trace   RLE Edema Trace   Facial Edema None             General: Well appearing, no distress  Respiratory: Unlabored breathing  Cardiovascular: Regular rate.  Abdomen: Soft, gravid, nontender  Fundal Height: Appropriate for gestational age.  Extremities: Warm and well perfused.  Non tender.

## 2024-05-02 NOTE — ASSESSMENT & PLAN NOTE
- S/p Failed ECV  -  planned on , patient given hibiclens and preoperative instructions including SSI education

## 2024-05-02 NOTE — ASSESSMENT & PLAN NOTE
- GBS negative  - Delivery Plan: LTCS for breech on 5/6  - Feeding: Breastfeeding  - Recommended discontinuing aspirin at 36 weeks  - Perineal massage education reviewed  - Fetal kick counts and labor precautions reviewed.

## 2024-05-03 ENCOUNTER — ANESTHESIA EVENT (INPATIENT)
Dept: LABOR AND DELIVERY | Facility: HOSPITAL | Age: 39
End: 2024-05-03
Payer: COMMERCIAL

## 2024-05-05 PROBLEM — Z3A.39 39 WEEKS GESTATION OF PREGNANCY: Status: ACTIVE | Noted: 2023-11-16

## 2024-05-05 NOTE — H&P
H & P- Obstetrics   Ama Morris 39 y.o. female MRN: [id number]  Unit/Bed#:  TRIAGE 2- Encounter: [id number]    Assessment: 39 y.o.  at 39w0d admitted for scheduled primary .  Clinical EFW: 50th percentile ; Breech presentation on ultrasound  GBS status: negative       Plan:   Anemia during pregnancy in third trimester  Assessment & Plan  Hgb most recently 11.5  Follow-up admission CBC    Marginal insertion of umbilical cord affecting management of mother in third trimester  Assessment & Plan       39 weeks gestation of pregnancy  Assessment & Plan       * Breech presentation  Assessment & Plan  Admit to OBGYN   NPO  F/u T&S, CBC, RPR   IVF LR 125cc/hr   Continuous fetal monitoring and tocometry   Anesthesia consult  Breech by TAUS  Proceed to OR for 1LTCS             Discussed case and plan w/ Dr. Sánchez      Chief Complaint: here for my     HPI: Ama Morris is a 39 y.o.  with an HAYLEY of 2024, by Embryo Transfer at 39w0d who is being admitted for scheduled 1LTCS.    Patient Active Problem List   Diagnosis    Multigravida of advanced maternal age in third trimester    Pregnancy resulting from in vitro fertilization in third trimester    39 weeks gestation of pregnancy    Marginal insertion of umbilical cord affecting management of mother in third trimester    Anemia during pregnancy in third trimester    Breech presentation       Baby complications/comments: none    Review of Systems   Constitutional:  Negative for chills and fever.   Respiratory:  Negative for cough, shortness of breath and wheezing.    Cardiovascular:  Negative for chest pain and leg swelling.   Gastrointestinal:  Negative for abdominal pain, diarrhea, nausea and vomiting.   Genitourinary:  Negative for pelvic pain, vaginal bleeding and vaginal discharge.   Musculoskeletal:  Negative for back pain.   Neurological:  Negative for weakness, light-headedness and headaches.       OB Hx:  OB History  "   Para Term  AB Living   2 1 1     1   SAB IAB Ectopic Multiple Live Births           1      # Outcome Date GA Lbr Aniket/2nd Weight Sex Delivery Anes PTL Lv   2 Current            1 Term 19 40w0d  3459 g (7 lb 10 oz) F Vag-Spont  N PIPE       Past Medical Hx:  Past Medical History:   Diagnosis Date    Abnormal Pap smear of cervix        Past Surgical hx:  Past Surgical History:   Procedure Laterality Date    LASIK         Social Hx:  Alcohol use: denies  Tobacco use: denies  Other substance use: denies      No Known Allergies    Medications Prior to Admission   Medication    Doxylamine Succinate, Sleep, (UNISOM PO)    Ferrous Sulfate (Iron) 325 (65 Fe) MG TABS    Prenatal Vit-Fe Fumarate-FA (PRENATAL VITAMIN AND MINERAL PO)    Pyridoxine HCl (vitamin B-6) 25 MG tablet       Objective:  Temp:  [96.9 °F (36.1 °C)] 96.9 °F (36.1 °C)  HR:  [97] 97  Resp:  [18] 18  BP: (131)/(86) 131/86  Body mass index is 35.08 kg/m².     Physical Exam:  Physical Exam  Constitutional:       Appearance: Normal appearance.   Cardiovascular:      Rate and Rhythm: Normal rate and regular rhythm.   Pulmonary:      Effort: Pulmonary effort is normal. No respiratory distress.   Abdominal:      Palpations: Abdomen is soft.      Tenderness: There is no abdominal tenderness.   Musculoskeletal:         General: No swelling or tenderness.   Neurological:      General: No focal deficit present.      Mental Status: She is alert and oriented to person, place, and time.   Skin:     General: Skin is warm and dry.   Vitals reviewed.            FHT:  Baseline Rate (FHR): 125 bpm  Variability: Moderate  Accelerations: 15 x 15 or greater  Decelerations: None    TOCO:   Contraction Frequency (minutes): n/a    Lab Results   Component Value Date    WBC 10.31 (H) 2024    HGB 11.4 (L) 2024    HCT 35.7 2024     2024     No results found for: \"NA\", \"K\", \"CL\", \"CO2\", \"BUN\", \"CREATININE\", \"GLUCOSE\", \"AST\", " "\"ALT\"  Prenatal Labs: Reviewed      Blood type: AB pos  Antibody: Negative    GBS: Negative  HIV: Non-reactive  Rubella: Immune  Syphilis IgM/IgG: Non-reactive  HBsAg: Non-reactive  HCAb: Non-reactive  Chlamydia: Negative  Gonorrhea: Negative  Diabetes 1 hour screen: 125  3 hour glucose: Not indicated  Platelets: 333  Hgb: 11.4  >2 Midnights  INPATIENT     Signature/Title: Miladys Braden MD  Date: 5/6/2024  Time: 7:04 AM    "

## 2024-05-05 NOTE — ASSESSMENT & PLAN NOTE
, Hgb 11.4 --> post op Hgb 10.0  S/P VT  Pain: Tylenol and toradol scheduled, mic 5/10 PRN    FEN: Tolerating regular diet  DVT ppx: SCDs and  Lovenox 40mg qD

## 2024-05-05 NOTE — ASSESSMENT & PLAN NOTE
Admit to OBGYN   NPO  F/u T&S, CBC, RPR   IVF LR 125cc/hr   Continuous fetal monitoring and tocometry   Anesthesia consult  Breech by TAUS  Proceed to OR for 1LTCS

## 2024-05-06 ENCOUNTER — ANESTHESIA (INPATIENT)
Dept: LABOR AND DELIVERY | Facility: HOSPITAL | Age: 39
End: 2024-05-06
Payer: COMMERCIAL

## 2024-05-06 ENCOUNTER — HOSPITAL ENCOUNTER (INPATIENT)
Facility: HOSPITAL | Age: 39
LOS: 2 days | Discharge: HOME | End: 2024-05-08
Attending: OBSTETRICS & GYNECOLOGY | Admitting: OBSTETRICS & GYNECOLOGY
Payer: COMMERCIAL

## 2024-05-06 DIAGNOSIS — Z98.891 S/P PRIMARY LOW TRANSVERSE C-SECTION: ICD-10-CM

## 2024-05-06 DIAGNOSIS — Z3A.39 39 WEEKS GESTATION OF PREGNANCY: Primary | ICD-10-CM

## 2024-05-06 PROBLEM — O43.193 MARGINAL INSERTION OF UMBILICAL CORD AFFECTING MANAGEMENT OF MOTHER IN THIRD TRIMESTER: Status: RESOLVED | Noted: 2024-01-09 | Resolved: 2024-05-06

## 2024-05-06 PROBLEM — O99.013 ANEMIA DURING PREGNANCY IN THIRD TRIMESTER: Status: RESOLVED | Noted: 2024-03-19 | Resolved: 2024-05-06

## 2024-05-06 LAB
ABO GROUP BLD: NORMAL
BASE EXCESS BLDCOA CALC-SCNC: 1.3 MMOL/L (ref 3–11)
BASE EXCESS BLDCOV CALC-SCNC: 0.8 MMOL/L (ref 1–9)
BLD GP AB SCN SERPL QL: NEGATIVE
ERYTHROCYTE [DISTWIDTH] IN BLOOD BY AUTOMATED COUNT: 14.9 % (ref 11.6–15.1)
HCO3 BLDCOA-SCNC: 26.6 MMOL/L (ref 17.3–27.3)
HCO3 BLDCOV-SCNC: 23.9 MMOL/L (ref 12.2–28.6)
HCT VFR BLD AUTO: 35.7 % (ref 34.8–46.1)
HGB BLD-MCNC: 11.4 G/DL (ref 11.5–15.4)
HOLD SPECIMEN: YES
MCH RBC QN AUTO: 24.7 PG (ref 26.8–34.3)
MCHC RBC AUTO-ENTMCNC: 31.9 G/DL (ref 31.4–37.4)
MCV RBC AUTO: 77 FL (ref 82–98)
O2 CT VFR BLDCOA CALC: 6.8 ML/DL
OXYHGB MFR BLDCOA: 29 %
OXYHGB MFR BLDCOV: 83.2 %
PCO2 BLDCOA: 44.6 MM[HG] (ref 30–60)
PCO2 BLDCOV: 34.3 MM HG (ref 27–43)
PH BLDCOA: 7.39 [PH] (ref 7.23–7.43)
PH BLDCOV: 7.46 [PH] (ref 7.19–7.49)
PLATELET # BLD AUTO: 333 THOUSANDS/UL (ref 149–390)
PMV BLD AUTO: 9.3 FL (ref 8.9–12.7)
PO2 BLDCOA: 14.9 MM HG (ref 5–25)
PO2 BLDCOV: 35 MM HG (ref 15–45)
RBC # BLD AUTO: 4.62 MILLION/UL (ref 3.81–5.12)
RH BLD: POSITIVE
SAO2 % BLDCOV: 19.7 ML/DL
SPECIMEN EXPIRATION DATE: NORMAL
TREPONEMA PALLIDUM IGG+IGM AB [PRESENCE] IN SERUM OR PLASMA BY IMMUNOASSAY: NORMAL
WBC # BLD AUTO: 10.31 THOUSAND/UL (ref 4.31–10.16)

## 2024-05-06 PROCEDURE — NC001 PR NO CHARGE: Performed by: NURSE PRACTITIONER

## 2024-05-06 PROCEDURE — 59510 CESAREAN DELIVERY: CPT | Performed by: OBSTETRICS & GYNECOLOGY

## 2024-05-06 PROCEDURE — NC001 PR NO CHARGE: Performed by: OBSTETRICS & GYNECOLOGY

## 2024-05-06 PROCEDURE — 85027 COMPLETE CBC AUTOMATED: CPT

## 2024-05-06 PROCEDURE — 86850 RBC ANTIBODY SCREEN: CPT

## 2024-05-06 PROCEDURE — 82805 BLOOD GASES W/O2 SATURATION: CPT | Performed by: OBSTETRICS & GYNECOLOGY

## 2024-05-06 PROCEDURE — 86780 TREPONEMA PALLIDUM: CPT

## 2024-05-06 PROCEDURE — 86900 BLOOD TYPING SEROLOGIC ABO: CPT

## 2024-05-06 PROCEDURE — 86901 BLOOD TYPING SEROLOGIC RH(D): CPT

## 2024-05-06 RX ORDER — FENTANYL CITRATE/PF 50 MCG/ML
25 SYRINGE (ML) INJECTION
Status: DISCONTINUED | OUTPATIENT
Start: 2024-05-06 | End: 2024-05-07

## 2024-05-06 RX ORDER — CEFAZOLIN SODIUM 2 G/50ML
2000 SOLUTION INTRAVENOUS ONCE
Status: COMPLETED | OUTPATIENT
Start: 2024-05-06 | End: 2024-05-06

## 2024-05-06 RX ORDER — SODIUM CHLORIDE, SODIUM LACTATE, POTASSIUM CHLORIDE, CALCIUM CHLORIDE 600; 310; 30; 20 MG/100ML; MG/100ML; MG/100ML; MG/100ML
125 INJECTION, SOLUTION INTRAVENOUS CONTINUOUS
Status: DISCONTINUED | OUTPATIENT
Start: 2024-05-06 | End: 2024-05-06

## 2024-05-06 RX ORDER — OXYTOCIN/RINGER'S LACTATE 30/500 ML
PLASTIC BAG, INJECTION (ML) INTRAVENOUS CONTINUOUS PRN
Status: DISCONTINUED | OUTPATIENT
Start: 2024-05-06 | End: 2024-05-06

## 2024-05-06 RX ORDER — DOCUSATE SODIUM 100 MG/1
100 CAPSULE, LIQUID FILLED ORAL 2 TIMES DAILY
Status: DISCONTINUED | OUTPATIENT
Start: 2024-05-06 | End: 2024-05-08 | Stop reason: HOSPADM

## 2024-05-06 RX ORDER — ENOXAPARIN SODIUM 100 MG/ML
40 INJECTION SUBCUTANEOUS DAILY
Status: DISCONTINUED | OUTPATIENT
Start: 2024-05-07 | End: 2024-05-08 | Stop reason: HOSPADM

## 2024-05-06 RX ORDER — PHENYLEPHRINE HCL IN 0.9% NACL 1 MG/10 ML
SYRINGE (ML) INTRAVENOUS
Status: DISPENSED
Start: 2024-05-06 | End: 2024-05-06

## 2024-05-06 RX ORDER — FENTANYL CITRATE 50 UG/ML
INJECTION, SOLUTION INTRAMUSCULAR; INTRAVENOUS AS NEEDED
Status: DISCONTINUED | OUTPATIENT
Start: 2024-05-06 | End: 2024-05-06

## 2024-05-06 RX ORDER — ACETAMINOPHEN 325 MG/1
650 TABLET ORAL EVERY 6 HOURS SCHEDULED
Status: DISCONTINUED | OUTPATIENT
Start: 2024-05-06 | End: 2024-05-07

## 2024-05-06 RX ORDER — BENZOCAINE/MENTHOL 6 MG-10 MG
1 LOZENGE MUCOUS MEMBRANE DAILY PRN
Status: DISCONTINUED | OUTPATIENT
Start: 2024-05-06 | End: 2024-05-08 | Stop reason: HOSPADM

## 2024-05-06 RX ORDER — BUPIVACAINE HYDROCHLORIDE 7.5 MG/ML
INJECTION, SOLUTION INTRASPINAL AS NEEDED
Status: DISCONTINUED | OUTPATIENT
Start: 2024-05-06 | End: 2024-05-06

## 2024-05-06 RX ORDER — METOCLOPRAMIDE HYDROCHLORIDE 5 MG/ML
10 INJECTION INTRAMUSCULAR; INTRAVENOUS EVERY 6 HOURS PRN
Status: DISCONTINUED | OUTPATIENT
Start: 2024-05-07 | End: 2024-05-08 | Stop reason: HOSPADM

## 2024-05-06 RX ORDER — MIDAZOLAM HYDROCHLORIDE 2 MG/2ML
INJECTION, SOLUTION INTRAMUSCULAR; INTRAVENOUS
Status: DISPENSED
Start: 2024-05-06 | End: 2024-05-06

## 2024-05-06 RX ORDER — SIMETHICONE 80 MG
80 TABLET,CHEWABLE ORAL 4 TIMES DAILY PRN
Status: DISCONTINUED | OUTPATIENT
Start: 2024-05-06 | End: 2024-05-08 | Stop reason: HOSPADM

## 2024-05-06 RX ORDER — KETOROLAC TROMETHAMINE 30 MG/ML
30 INJECTION, SOLUTION INTRAMUSCULAR; INTRAVENOUS EVERY 6 HOURS PRN
Status: DISPENSED | OUTPATIENT
Start: 2024-05-06 | End: 2024-05-07

## 2024-05-06 RX ORDER — MORPHINE SULFATE 0.5 MG/ML
INJECTION, SOLUTION EPIDURAL; INTRATHECAL; INTRAVENOUS AS NEEDED
Status: DISCONTINUED | OUTPATIENT
Start: 2024-05-06 | End: 2024-05-06

## 2024-05-06 RX ORDER — OXYCODONE HYDROCHLORIDE 5 MG/1
5 TABLET ORAL EVERY 4 HOURS PRN
Status: DISCONTINUED | OUTPATIENT
Start: 2024-05-06 | End: 2024-05-07

## 2024-05-06 RX ORDER — KETOROLAC TROMETHAMINE 30 MG/ML
INJECTION, SOLUTION INTRAMUSCULAR; INTRAVENOUS
Status: DISPENSED
Start: 2024-05-06 | End: 2024-05-06

## 2024-05-06 RX ORDER — OXYCODONE HYDROCHLORIDE 5 MG/1
10 TABLET ORAL EVERY 4 HOURS PRN
Status: DISCONTINUED | OUTPATIENT
Start: 2024-05-07 | End: 2024-05-08 | Stop reason: HOSPADM

## 2024-05-06 RX ORDER — MORPHINE SULFATE 0.5 MG/ML
INJECTION, SOLUTION EPIDURAL; INTRATHECAL; INTRAVENOUS
Status: COMPLETED
Start: 2024-05-06 | End: 2024-05-06

## 2024-05-06 RX ORDER — CALCIUM CARBONATE 500 MG/1
1000 TABLET, CHEWABLE ORAL DAILY PRN
Status: DISCONTINUED | OUTPATIENT
Start: 2024-05-06 | End: 2024-05-08 | Stop reason: HOSPADM

## 2024-05-06 RX ORDER — ONDANSETRON 2 MG/ML
4 INJECTION INTRAMUSCULAR; INTRAVENOUS EVERY 8 HOURS PRN
Status: DISCONTINUED | OUTPATIENT
Start: 2024-05-06 | End: 2024-05-06

## 2024-05-06 RX ORDER — EPHEDRINE SULFATE 50 MG/ML
INJECTION INTRAVENOUS
Status: DISPENSED
Start: 2024-05-06 | End: 2024-05-06

## 2024-05-06 RX ORDER — FENTANYL CITRATE 50 UG/ML
INJECTION, SOLUTION INTRAMUSCULAR; INTRAVENOUS
Status: COMPLETED
Start: 2024-05-06 | End: 2024-05-06

## 2024-05-06 RX ORDER — IBUPROFEN 600 MG/1
600 TABLET ORAL EVERY 6 HOURS PRN
Status: DISCONTINUED | OUTPATIENT
Start: 2024-05-07 | End: 2024-05-07

## 2024-05-06 RX ORDER — ONDANSETRON 2 MG/ML
4 INJECTION INTRAMUSCULAR; INTRAVENOUS ONCE AS NEEDED
Status: DISCONTINUED | OUTPATIENT
Start: 2024-05-06 | End: 2024-05-07

## 2024-05-06 RX ORDER — KETOROLAC TROMETHAMINE 30 MG/ML
INJECTION, SOLUTION INTRAMUSCULAR; INTRAVENOUS AS NEEDED
Status: DISCONTINUED | OUTPATIENT
Start: 2024-05-06 | End: 2024-05-06

## 2024-05-06 RX ORDER — PHENYLEPHRINE HYDROCHLORIDE 10 MG/ML
INJECTION INTRAVENOUS
Status: DISPENSED
Start: 2024-05-06 | End: 2024-05-06

## 2024-05-06 RX ORDER — OXYTOCIN/RINGER'S LACTATE 30/500 ML
62.5 PLASTIC BAG, INJECTION (ML) INTRAVENOUS ONCE
Status: COMPLETED | OUTPATIENT
Start: 2024-05-06 | End: 2024-05-06

## 2024-05-06 RX ORDER — ONDANSETRON 2 MG/ML
4 INJECTION INTRAMUSCULAR; INTRAVENOUS EVERY 6 HOURS PRN
Status: ACTIVE | OUTPATIENT
Start: 2024-05-06 | End: 2024-05-07

## 2024-05-06 RX ORDER — SODIUM CHLORIDE, SODIUM LACTATE, POTASSIUM CHLORIDE, CALCIUM CHLORIDE 600; 310; 30; 20 MG/100ML; MG/100ML; MG/100ML; MG/100ML
1000 INJECTION, SOLUTION INTRAVENOUS CONTINUOUS
Status: DISCONTINUED | OUTPATIENT
Start: 2024-05-06 | End: 2024-05-06

## 2024-05-06 RX ORDER — SODIUM CHLORIDE, SODIUM LACTATE, POTASSIUM CHLORIDE, CALCIUM CHLORIDE 600; 310; 30; 20 MG/100ML; MG/100ML; MG/100ML; MG/100ML
125 INJECTION, SOLUTION INTRAVENOUS CONTINUOUS
Status: DISCONTINUED | OUTPATIENT
Start: 2024-05-06 | End: 2024-05-08 | Stop reason: HOSPADM

## 2024-05-06 RX ORDER — NALOXONE HYDROCHLORIDE 0.4 MG/ML
0.1 INJECTION, SOLUTION INTRAMUSCULAR; INTRAVENOUS; SUBCUTANEOUS
Status: ACTIVE | OUTPATIENT
Start: 2024-05-06 | End: 2024-05-07

## 2024-05-06 RX ORDER — DIPHENHYDRAMINE HYDROCHLORIDE 50 MG/ML
25 INJECTION INTRAMUSCULAR; INTRAVENOUS EVERY 6 HOURS PRN
Status: DISCONTINUED | OUTPATIENT
Start: 2024-05-06 | End: 2024-05-08 | Stop reason: HOSPADM

## 2024-05-06 RX ORDER — NALBUPHINE HYDROCHLORIDE 10 MG/ML
5 INJECTION, SOLUTION INTRAMUSCULAR; INTRAVENOUS; SUBCUTANEOUS
Status: ACTIVE | OUTPATIENT
Start: 2024-05-06 | End: 2024-05-07

## 2024-05-06 RX ORDER — OXYCODONE HYDROCHLORIDE 5 MG/1
5 TABLET ORAL EVERY 4 HOURS PRN
Status: DISCONTINUED | OUTPATIENT
Start: 2024-05-07 | End: 2024-05-08 | Stop reason: HOSPADM

## 2024-05-06 RX ORDER — ONDANSETRON 2 MG/ML
4 INJECTION INTRAMUSCULAR; INTRAVENOUS EVERY 8 HOURS PRN
Status: DISCONTINUED | OUTPATIENT
Start: 2024-05-07 | End: 2024-05-08 | Stop reason: HOSPADM

## 2024-05-06 RX ADMIN — SODIUM CHLORIDE, SODIUM LACTATE, POTASSIUM CHLORIDE, AND CALCIUM CHLORIDE 125 ML/HR: .6; .31; .03; .02 INJECTION, SOLUTION INTRAVENOUS at 16:49

## 2024-05-06 RX ADMIN — Medication 250 MILLI-UNITS/MIN: at 08:05

## 2024-05-06 RX ADMIN — SODIUM CHLORIDE, SODIUM LACTATE, POTASSIUM CHLORIDE, AND CALCIUM CHLORIDE 1000 ML: .6; .31; .03; .02 INJECTION, SOLUTION INTRAVENOUS at 07:00

## 2024-05-06 RX ADMIN — SODIUM CHLORIDE, SODIUM LACTATE, POTASSIUM CHLORIDE, AND CALCIUM CHLORIDE 125 ML/HR: .6; .31; .03; .02 INJECTION, SOLUTION INTRAVENOUS at 07:26

## 2024-05-06 RX ADMIN — MORPHINE SULFATE 0.1 MG: 0.5 INJECTION, SOLUTION EPIDURAL; INTRATHECAL; INTRAVENOUS at 07:46

## 2024-05-06 RX ADMIN — KETOROLAC TROMETHAMINE 30 MG: 30 INJECTION, SOLUTION INTRAMUSCULAR; INTRAVENOUS at 21:08

## 2024-05-06 RX ADMIN — ACETAMINOPHEN 325MG 650 MG: 325 TABLET ORAL at 09:32

## 2024-05-06 RX ADMIN — ACETAMINOPHEN 325MG 650 MG: 325 TABLET ORAL at 23:44

## 2024-05-06 RX ADMIN — KETOROLAC TROMETHAMINE 30 MG: 30 INJECTION, SOLUTION INTRAMUSCULAR at 08:23

## 2024-05-06 RX ADMIN — ACETAMINOPHEN 325MG 650 MG: 325 TABLET ORAL at 17:30

## 2024-05-06 RX ADMIN — SODIUM CHLORIDE, SODIUM LACTATE, POTASSIUM CHLORIDE, AND CALCIUM CHLORIDE 125 ML/HR: .6; .31; .03; .02 INJECTION, SOLUTION INTRAVENOUS at 09:20

## 2024-05-06 RX ADMIN — KETOROLAC TROMETHAMINE 30 MG: 30 INJECTION, SOLUTION INTRAMUSCULAR; INTRAVENOUS at 14:37

## 2024-05-06 RX ADMIN — DOCUSATE SODIUM 100 MG: 100 CAPSULE, LIQUID FILLED ORAL at 17:30

## 2024-05-06 RX ADMIN — FENTANYL CITRATE 15 MCG: 50 INJECTION INTRAMUSCULAR; INTRAVENOUS at 07:46

## 2024-05-06 RX ADMIN — PHENYLEPHRINE HYDROCHLORIDE 50 MCG/MIN: 10 INJECTION INTRAVENOUS at 07:46

## 2024-05-06 RX ADMIN — CEFAZOLIN SODIUM 2000 MG: 2 SOLUTION INTRAVENOUS at 07:48

## 2024-05-06 RX ADMIN — BUPIVACAINE HYDROCHLORIDE IN DEXTROSE 2 ML: 7.5 INJECTION, SOLUTION SUBARACHNOID at 07:46

## 2024-05-06 RX ADMIN — Medication 62.5 MILLI-UNITS/MIN: at 10:45

## 2024-05-06 NOTE — OP NOTE
OPERATIVE REPORT  PATIENT NAME: Ama Morris    :  1985  MRN: [id number]  Pt Location: [facility] L&D OR ROOM 01    SURGERY DATE: 2024    Surgeons and Role:     * Katie Sánchez MD - Primary     * Miladys Braden MD - Assisting    Preop Diagnosis:  9 weeks gestation of pregnancy  Breech presentation  Marginal cord insertion        Procedure(s) (LRB):   SECTION () (N/A)    Specimen(s):  ID Type Source Tests Collected by Time Destination   A :  Cord Blood Cord BLOOD GAS, VENOUS, CORD Katie Sánchez MD 2024 0732    B :  Cord Blood Cord BLOOD GAS, ARTERIAL, CORD Katie Sánchez MD 2024 0732    C :  Tissue (Placenta on Hold) OB Only Placenta PLACENTA IN STORAGE Katie Sánchez MD 2024 0802        Surgical QBL:  Surgical QBL (mL): 319 mL      Drains:  Urethral Catheter 16 Fr. (Active)   Number of days: 0       Anesthesia Type:   Spinal    Operative Indications:  Patient presented to labor and delivery for her scheduled primary  in the setting of breech presentation.  On arrival transabdominal ultrasound was performed and confirmed ratna breech position.  We proceeded to the operating room.     Cody Group Classification System:  No Multiple pregnancy, No Transverse or oblique lie, Breech lie, Multiparous +  is CODY GROUP 7    Operative Findings:  1. Viable female  at 0804 with APGARs of 9 and 9 at 1 and 5 minutes. Fetus weighted 8lb 1.5oz.  2. Normal intact placenta with centrally inserted 3VC expressed at 0806.   3. Normal uterus, bilateral tubes and ovaries.  4. Blood gases:   Umbilical Cord Venous Blood Gas:                Results from last 7 days   Lab Units 24  0732   PH COV  7.461   PCO2 COV mm HG 34.3   HCO3 COV mmol/L 23.9   BASE EXC COV mmol/L 0.8*   O2 CT CD VB mL/dL 19.7   O2 HGB, VENOUS CORD % 83.2                 Umbilical Cord Arterial Blood Gas:                Results from last 7 days   Lab Units 24  0732   PH COA  7.394   PCO2 COA   44.6   PO2 COA mm HG 14.9   HCO3 COA mmol/L 26.6   BASE EXC COA mmol/L 1.3*   O2 CONTENT CORD ART ml/dl 6.8   O2 HGB, ARTERIAL CORD % 29.0       Complications:   None apparent    Procedure and Technique:  The patient was taken to the operating room. Spinal anesthesia was adequately established and 2g Ancef was given for preoperative prophylaxis.  The patient was then placed in the dorsal supine position with a left tilt of the hips. The patient was then prepped with chloroprep for both vaginal and abdominal prep and draped in the usual sterile fashion for a Pfannenstiel skin incision.      A time out was performed to confirm correct patient and correct procedure. An incision was made in the skin with a surgical scalpel and sharp dissection was carried out over subsequent layers of tissue including the fascia, followed by the Bovie electrocautery for hemostasis.  The fascia was incised at the midline and the fascial incision was extended bilaterally using the curved Torres scissors.      The superior edge of the fascial incision was grasped with Kocher clamps, tented up and the underlying rectus muscles were dissected off bluntly.  This was repeated inferiorly and Torres scissors were used for sharp dissection. The rectus muscles were then divided at midline and the peritoneum was identified, tented up at its upper margin taking care to avoid the bladder, and then entered.  The peritoneal incision was extended superiorly and inferiorly.  The bladder blade was inserted and a transverse incision was made in the lower uterine segment using a new surgical blade.  The uterine incision was extended cephalad and caudal using blunt dissection.  The amniotic sac was entered and the amniotic fluid was noted to be clear .    The surgeon's hand was placed in the uterus, and fetal pelvis was palpated with the fetus in ratna breech position.  The fetal pelvis was elevated to the level of the hysterotomy, at which time the surgeon's  hands were placed with thumbs on the fetal sacrum and fingers on the fetal anterior superior iliac spines.  The fetal pelvis was delivered followed by the fetal legs which remained extended. The fetus was then delivered to the level of the scapulae.  The fetus was rotated to have the right arm anterior, and the Loveset maneuver was performed to deliver the right fetal arm.  The fetus was rotated to have the left arm anterior, and the left fetal arm was delivered in the same fashion.  After which the fetal head spontaneously delivered.    There was a single nuchal cord noted. On delivery the cord was doubly clamped and cut after delayed cord clamping.  The infant was then passed off the table to the awaiting  staff.  Venous and arterial blood gas, cord blood, and portion of cord was obtained for analysis and routine blood testing.  The placenta delivery was then sent to storage. Placenta was noted to be intact with a centrally inserted three-vessel cord.  Oxytocin was administered by IV infusion to enhance uterine contraction.  The uterus was exteriorized and cleared of all clots and remaining products of conception.      The uterine incision was re approximated using a 0 Vicryl in a running locked fashion.  A second horizontal imbricating stitch with 0 Vicryl was applied.  The uterine incision was examined and serosal oozing was noted.  Several figures-of-eight of 0 Vicryl were applied followed by Bovie electrocautery which time the hysterotomy was noted to be hemostatic.  The posterior cul-de-sac was cleared of all clots and products of conception.  The uterus was replaced into the abdomen and the pericolic gutters were cleared of all clots.  The uterine incision was once again reexamined and noted to be hemostatic.   The rectus muscles were reapproximated using 0 chromic in a running unlocked fashion.    The fascia was re approximated using 0 Vicryl in a running nonlocked fashion. The subcutaneous tissue  was irrigated and cleared of all clots and debris.  Good hemostasis was noted with Bovie electrocautery. The subcutaneous  tissue was reapproximated with 0 Vicryl.  The skin incision was closed using 4-0 Stratafix with benzoin and Steristrips.  Good hemostasis was noted.  Patient tolerated the procedure well.  All needle, sponge, and instrument counts were noted to be correct x 2 at the end of the procedure.  Patient was transferred to the recovery room in stable condition.  Dr. Sánchez was present for the procedure.      Patient Disposition:  PACU         SIGNATURE: Miladys Braden MD  DATE: May 6, 2024  TIME: 9:13 AM

## 2024-05-06 NOTE — ANESTHESIA PROCEDURE NOTES
Spinal Block    Patient location during procedure: OR  Start time: 5/6/2024 7:46 AM  Reason for block: primary anesthetic  Staffing  Performed by: Aubrey Parra DO  Authorized by: Aubrey Parra DO    Preanesthetic Checklist  Completed: patient identified, IV checked, site marked, risks and benefits discussed, surgical consent, monitors and equipment checked, pre-op evaluation and timeout performed  Spinal Block  Patient position: sitting  Prep: ChloraPrep  Patient monitoring: heart rate, cardiac monitor, continuous pulse ox and frequent blood pressure checks  Approach: midline  Location: L3-4  Needle  Needle type: Pencan   Needle gauge: 24 G  Assessment  Sensory level: T4  Injection Assessment:  negative aspiration for heme, no paresthesia on injection and positive aspiration for clear CSF.

## 2024-05-06 NOTE — PROGRESS NOTES
Ama Morris  [id number]  5/6/2024  3:41 PM    POST-OP CHECK    SUBJECTIVE:  Ama Morris is doing well. Pain well controlled. Denies nausea/vomiting, chest pain, shortness of breath. No complaints at this time.    OBJECTIVE:  Vitals:    05/06/24 1500   BP: 112/76   Pulse: 72   Resp: 18   Temp: 97.5 °F (36.4 °C)   SpO2: 97%         Physical Exam  Constitutional:       Appearance: Normal appearance.   Cardiovascular:      Rate and Rhythm: Normal rate and regular rhythm.   Pulmonary:      Effort: Pulmonary effort is normal. No respiratory distress.   Abdominal:      Palpations: Abdomen is soft.      Tenderness: There is no abdominal tenderness.      Comments: Incision CDI  Uterine fundus 2 below the umbilicus  Appropriately tender to palpation   Musculoskeletal:         General: No swelling or tenderness.   Neurological:      General: No focal deficit present.      Mental Status: She is alert and oriented to person, place, and time.   Skin:     General: Skin is warm and dry.   Vitals reviewed.         ASSESSMENT:  Ama Morris is s/p 1LTCS and meeting postoperative milestones    PLAN:  Routine postop check  IV/PO pain meds as needed  Continue regular diet  Antiemetics for nausea/vomiting prn  Follow up am labs  Adequate UOP, continue to monitor  SCDs and Lovenox for DVT ppx, encourage ambulation as tolerated    Miladys Braden MD  OBGYN Resident  5/6/2024  3:41 PM

## 2024-05-06 NOTE — DISCHARGE SUMMARY
Discharge Summary - Ama Morris 39 y.o. female MRN: [id number]    Unit/Bed#: -01 Encounter: [id number]    Admission Date: 2024     Discharge Date: 24    Patient Active Problem List   Diagnosis    Multigravida of advanced maternal age in third trimester    Pregnancy resulting from in vitro fertilization in third trimester    S/P primary low transverse        [facility]N Practice: [facility]    Hospital Course:   Ama Morris is a 39 y.o.  who was admitted at 39w1d for her scheduled primary  in the setting of breech presentation.  On arrival transabdominal ultrasound was performed and confirmed ratna breech position.  We proceeded to the operating room.     Female infant weighing 8 pounds 1.5 ounces with Apgars of 9/9.         Delivery Findings:  Ama delivered a viable female  on 2024  8:04 AM  via , Low Transverse  . The delivery was uncomplicated    Baby's Weight: 3670 g (8 lb 1.5 oz) ; 129.45     Apgar scores: 9  and 9  at 1 and 5 minutes, respectively  Anesthesia:  ,   QBL: 319          was transferred to  nursery. Patient tolerated the procedure well and was transferred to recovery in stable condition.     Her post-partum course was uncomplicated.  Her post-partum pain was well controlled with oral analgesics. On day of discharge she was ambulating, voiding spontaneously, tolerating oral intake and hemodynamically stable. Mom's blood type is AB positive and  Rhogam was not given.    She was discharged home on postpartum day #2 without complications. Patient was instructed to follow up with her OB as an outpatient and was given appropriate warnings to call doctor or provider if she develops signs of infection or uncontrolled pain.    Discharge Problem List by Issue:   * S/P primary low transverse   Assessment & Plan  , Hgb 11.4 --> post op Hgb 10.0  S/P VT  Pain: Tylenol and toradol scheduled, mic 5/10 PRN     FEN: Tolerating regular diet  DVT ppx: SCDs and  Lovenox 40mg qD       Breech presentation-resolved as of 2024  Assessment & Plan  Admit to OBGYN   NPO  F/u T&S, CBC, RPR   IVF LR 125cc/hr   Continuous fetal monitoring and tocometry   Anesthesia consult  Breech by TAUS  Proceed to OR for 1LTCS       Anemia during pregnancy in third trimester-resolved as of 2024  Assessment & Plan  Hgb most recently 11.5  Follow-up admission CBC    Marginal insertion of umbilical cord affecting management of mother in third trimester-resolved as of 2024  Assessment & Plan            Disposition: Home    Planned Readmission: No    Discharge Medications:   Please see AVS    Discharge instructions :   -Do not place anything (no partner, tampons or douche) in your vagina for 6 weeks.  -You may walk for exercise for the first 6 weeks then gradually return to your usual activities.   -Please do not drive for 1 week if you have no stitches and for 2 weeks if you have stitches or underwent a  delivery.    -You may take baths or shower per your preference.   -Please look at your bust (breasts) in the mirror daily and call your doctor for redness or tenderness or increased warmth.   - If you have had a  section please look at your incision daily as well and call provider for increasing redness or steady drainage from the incision.   -Please call your doctor's office if temperature > 100.4*F or 38* C, worsening pain or a foul discharge.    Follow Up:  - Follow up in 3 weeks for postpartum visit  -Postpartum contraception    LORE Johnson

## 2024-05-06 NOTE — ANESTHESIA POSTPROCEDURE EVALUATION
Post-Op Assessment Note    CV Status:  Stable  Pain Score: 1    Pain management: adequate       Mental Status:  Alert and awake   Hydration Status:  Euvolemic   PONV Controlled:  Controlled   Airway Patency:  Patent     Post Op Vitals Reviewed: Yes    No anethesia notable event occurred.    Staff: Anesthesiologist               BP 99/54 (05/06/24 0900)    Temp      Pulse 84 (05/06/24 0900)   Resp 18 (05/06/24 0900)    SpO2 97 % (05/06/24 0900)

## 2024-05-06 NOTE — ANESTHESIA PREPROCEDURE EVALUATION
Procedure:   SECTION () (Uterus)    Relevant Problems   GYN   (+) 39 weeks gestation of pregnancy   (+) Multigravida of advanced maternal age in third trimester      HEMATOLOGY   (+) Anemia during pregnancy in third trimester        Physical Exam    Airway    Mallampati score: II         Dental       Cardiovascular  Rhythm: regular, Rate: normal    Pulmonary   Breath sounds clear to auscultation    Other Findings  post-pubertal.      Anesthesia Plan  ASA Score- 2     Anesthesia Type- spinal with ASA Monitors.         Additional Monitors:     Airway Plan:            Plan Factors-Exercise tolerance (METS): >4 METS.    Chart reviewed.   Existing labs reviewed. Patient summary reviewed.    Patient is not a current smoker.      Obstructive sleep apnea risk education given perioperatively.        Induction- intravenous.    Postoperative Plan-     Informed Consent- Anesthetic plan and risks discussed with patient.

## 2024-05-06 NOTE — DISCHARGE INSTRUCTIONS
WHAT YOU NEED TO KNOW:   A , or  section, is abdominal surgery to deliver your baby. A  may also be done if you are pregnant with more than 1 baby.  DISCHARGE INSTRUCTIONS:   Call your local emergency number (911 in the [place]) if:   You feel lightheaded, short of breath, and have chest pain.     You cough up blood.    Call your obstetrician if:   Blood soaks through your bandage.     Your stitches come apart.     Your arm or leg feels warm, tender, and painful. It may look swollen and red.    You have heavy vaginal bleeding that fills 1 or more sanitary pads in 1 hour.    You have a fever.     Your incision is swollen, red, or draining pus.     You have questions or concerns about yourself or your baby.    Medicines:  You may  need any of the following:  Prescription pain medicine  may be given. Ask your healthcare provider how to take this medicine safely. Some prescription pain medicines contain acetaminophen. Do not take other medicines that contain acetaminophen without talking to your healthcare provider. Too much acetaminophen may cause liver damage. Prescription pain medicine may cause constipation. Ask your healthcare provider how to prevent or treat constipation.     Acetaminophen  decreases pain and fever. It is available without a doctor's order. Ask how much to take and how often to take it. Follow directions. Read the labels of all other medicines you are using to see if they also contain acetaminophen, or ask your doctor or pharmacist. Acetaminophen can cause liver damage if not taken correctly.    NSAIDs , such as ibuprofen, help decrease swelling, pain, and fever. NSAIDs can cause stomach bleeding or kidney problems in certain people. If you take blood thinner medicine, always ask your healthcare provider if NSAIDs are safe for you. Always read the medicine label and follow directions.    Take your medicine as directed.  Contact your healthcare provider if you think  your medicine is not helping or if you have side effects. Tell your provider if you are allergic to any medicine. Keep a list of the medicines, vitamins, and herbs you take. Include the amounts, and when and why you take them. Bring the list or the pill bottles to follow-up visits. Carry your medicine list with you in case of an emergency.    Wound care:  Carefully wash your incision wound with soap and water every day. Keep your wound clean and dry. Wear loose, comfortable clothes that do not rub against your wound. Ask about bathing and showering.  Limit activity as directed:   Ask when it is safe for you to drive, walk up stairs, lift heavy objects, and have sex.     Ask when it is okay to exercise, and what types of exercise to do. Start slowly and do more as you get stronger.    Drink liquids as directed:  Liquids help keep you hydrated after your procedure and decrease your risk for a blood clot. Ask how much liquid to drink each day and which liquids are best for you.   Follow up with your obstetrician as directed:  You may need to return to have your stitches or staples removed. Write down your questions so you remember to ask them during your visits.  © Copyright [employer] 2022 Information is for End User's use only and may not be sold, redistributed or otherwise used for commercial purposes.  The above information is an  only. It is not intended as medical advice for individual conditions or treatments. Talk to your doctor, nurse or pharmacist before following any medical regimen to see if it is safe and effective for you.

## 2024-05-07 LAB
ERYTHROCYTE [DISTWIDTH] IN BLOOD BY AUTOMATED COUNT: 14.9 % (ref 11.6–15.1)
HCT VFR BLD AUTO: 31.7 % (ref 34.8–46.1)
HGB BLD-MCNC: 10 G/DL (ref 11.5–15.4)
MCH RBC QN AUTO: 25 PG (ref 26.8–34.3)
MCHC RBC AUTO-ENTMCNC: 31.5 G/DL (ref 31.4–37.4)
MCV RBC AUTO: 79 FL (ref 82–98)
PLATELET # BLD AUTO: 254 THOUSANDS/UL (ref 149–390)
PMV BLD AUTO: 9.1 FL (ref 8.9–12.7)
RBC # BLD AUTO: 4 MILLION/UL (ref 3.81–5.12)
WBC # BLD AUTO: 10.43 THOUSAND/UL (ref 4.31–10.16)

## 2024-05-07 PROCEDURE — 99024 POSTOP FOLLOW-UP VISIT: CPT | Performed by: OBSTETRICS & GYNECOLOGY

## 2024-05-07 PROCEDURE — 85027 COMPLETE CBC AUTOMATED: CPT

## 2024-05-07 RX ORDER — ACETAMINOPHEN 325 MG/1
650 TABLET ORAL EVERY 6 HOURS
Status: DISCONTINUED | OUTPATIENT
Start: 2024-05-08 | End: 2024-05-08 | Stop reason: HOSPADM

## 2024-05-07 RX ORDER — IBUPROFEN 600 MG/1
600 TABLET ORAL EVERY 6 HOURS PRN
Status: DISCONTINUED | OUTPATIENT
Start: 2024-05-07 | End: 2024-05-08 | Stop reason: HOSPADM

## 2024-05-07 RX ADMIN — IBUPROFEN 600 MG: 600 TABLET, FILM COATED ORAL at 15:11

## 2024-05-07 RX ADMIN — IBUPROFEN 600 MG: 600 TABLET, FILM COATED ORAL at 21:02

## 2024-05-07 RX ADMIN — DOCUSATE SODIUM 100 MG: 100 CAPSULE, LIQUID FILLED ORAL at 17:56

## 2024-05-07 RX ADMIN — DOCUSATE SODIUM 100 MG: 100 CAPSULE, LIQUID FILLED ORAL at 08:59

## 2024-05-07 RX ADMIN — ACETAMINOPHEN 325MG 650 MG: 325 TABLET ORAL at 17:56

## 2024-05-07 RX ADMIN — ACETAMINOPHEN 325MG 650 MG: 325 TABLET ORAL at 05:45

## 2024-05-07 RX ADMIN — IBUPROFEN 600 MG: 600 TABLET, FILM COATED ORAL at 08:59

## 2024-05-07 RX ADMIN — ACETAMINOPHEN 325MG 650 MG: 325 TABLET ORAL at 12:39

## 2024-05-07 RX ADMIN — ENOXAPARIN SODIUM 40 MG: 40 INJECTION SUBCUTANEOUS at 08:59

## 2024-05-07 RX ADMIN — KETOROLAC TROMETHAMINE 30 MG: 30 INJECTION, SOLUTION INTRAMUSCULAR; INTRAVENOUS at 03:06

## 2024-05-07 NOTE — PROGRESS NOTES
"Progress Note - OB/GYN  Ama Morris 39 y.o. female MRN: [id number]  Unit/Bed#:  414-01 Encounter: [id number]    Assessment and Plan     Ama Morris is a patient of: [facility]. She is POD# 1 s/p  primary  section, low transverse incision  Recovering well and is stable       * S/P primary low transverse   Assessment & Plan  , Hgb 11.4 --> post op Hgb 10.0  S/P VT  Pain: Tylenol and toradol scheduled, mic 5/10 PRN    FEN: Tolerating regular diet  DVT ppx: SCDs and  Lovenox 40mg qD       Breech presentation-resolved as of 2024  Assessment & Plan  Admit to OBGYN   NPO  F/u T&S, CBC, RPR   IVF LR 125cc/hr   Continuous fetal monitoring and tocometry   Anesthesia consult  Breech by TAUS  Proceed to OR for 1LTCS       Anemia during pregnancy in third trimester-resolved as of 2024  Assessment & Plan  Hgb most recently 11.5  Follow-up admission CBC    Marginal insertion of umbilical cord affecting management of mother in third trimester-resolved as of 2024  Assessment & Plan           Disposition    - Anticipate discharge home on POD# 2-3      Subjective/Objective     Chief Complaint: Postpartum State     Subjective:    Ama Morris is POD#1 s/p  primary  section, low transverse incision. She has no current complaints.  Pain is well controlled.  Patient is currently voiding.  She is ambulating.  Patient is currently passing flatus and has had no bowel movement. She is tolerating PO, and denies nausea or vomitting. Patient denies fever, chills, chest pain, shortness of breath, or calf tenderness. Lochia is normal. She is  Bottle feeding. She is recovering well and is stable.       Vitals:   /74 (BP Location: Right arm)   Pulse 83   Temp 97.8 °F (36.6 °C) (Oral)   Resp 20   Ht 5' 7\" (1.702 m)   Wt 102 kg (224 lb)   LMP 2023   SpO2 97%   Breastfeeding Unknown   BMI 35.08 kg/m²       Intake/Output Summary (Last 24 hours) at 2024 " 0656  Last data filed at 5/7/2024 0559  Gross per 24 hour   Intake 2153.6 ml   Output 2569 ml   Net -415.4 ml       Invasive Devices       Peripheral Intravenous Line  Duration             Peripheral IV 05/06/24 Dorsal (posterior);Left Hand 1 day                    Physical Exam:   GEN: Ama Morris appears well, alert and oriented x 3, pleasant and cooperative   CARDIO: RRR, no murmurs or rubs  RESP:  CTAB, no wheezes or rales  ABDOMEN: soft, no tenderness, no distention, fundus @ U-2, Incision C/D/I  EXTREMITIES: non tender, no erythema,      Labs:     Hemoglobin   Date Value Ref Range Status   05/07/2024 10.0 (L) 11.5 - 15.4 g/dL Final   05/06/2024 11.4 (L) 11.5 - 15.4 g/dL Final     WBC   Date Value Ref Range Status   05/07/2024 10.43 (H) 4.31 - 10.16 Thousand/uL Final   05/06/2024 10.31 (H) 4.31 - 10.16 Thousand/uL Final     Platelets   Date Value Ref Range Status   05/07/2024 254 149 - 390 Thousands/uL Final   05/06/2024 333 149 - 390 Thousands/uL Final          Miladys Braden MD  5/7/2024  6:56 AM

## 2024-05-08 VITALS
HEART RATE: 97 BPM | OXYGEN SATURATION: 98 % | HEIGHT: 67 IN | RESPIRATION RATE: 18 BRPM | WEIGHT: 224 LBS | TEMPERATURE: 98.1 F | SYSTOLIC BLOOD PRESSURE: 124 MMHG | BODY MASS INDEX: 35.16 KG/M2 | DIASTOLIC BLOOD PRESSURE: 79 MMHG

## 2024-05-08 PROCEDURE — 99024 POSTOP FOLLOW-UP VISIT: CPT | Performed by: NURSE PRACTITIONER

## 2024-05-08 RX ORDER — IBUPROFEN 600 MG/1
600 TABLET ORAL EVERY 6 HOURS PRN
Start: 2024-05-08

## 2024-05-08 RX ORDER — ACETAMINOPHEN 325 MG/1
650 TABLET ORAL EVERY 6 HOURS PRN
Start: 2024-05-08

## 2024-05-08 RX ADMIN — IBUPROFEN 600 MG: 600 TABLET, FILM COATED ORAL at 08:03

## 2024-05-08 RX ADMIN — ACETAMINOPHEN 650 MG: 325 TABLET, FILM COATED ORAL at 12:46

## 2024-05-08 RX ADMIN — ENOXAPARIN SODIUM 40 MG: 40 INJECTION SUBCUTANEOUS at 08:57

## 2024-05-08 RX ADMIN — DOCUSATE SODIUM 100 MG: 100 CAPSULE, LIQUID FILLED ORAL at 08:03

## 2024-05-08 RX ADMIN — ACETAMINOPHEN 650 MG: 325 TABLET, FILM COATED ORAL at 05:22

## 2024-05-08 NOTE — PROGRESS NOTES
Save your life magnet given to patient. Discharge teaching completed, paperwork given, patient watched education videos. All questions answered.

## 2024-05-08 NOTE — PROGRESS NOTES
"Progress Note - OB/GYN  Ama Morris 39 y.o. female MRN: [id number]  Unit/Bed#:  414-01 Encounter: [id number]    Assessment and Plan     Ama Morris is a patient of: [facility]. She is PPD# 2 s/p  repeat  section, low transverse incision for breech presentation.   Recovering well and is stable desires DC today      * S/P primary low transverse   Assessment & Plan  , Hgb 11.4 --> post op Hgb 10.0  S/P VT  Pain: Tylenol and toradol scheduled, mic 5/10 PRN    FEN: Tolerating regular diet  DVT ppx: SCDs and  Lovenox 40mg qD       Breech presentation-resolved as of 2024  Assessment & Plan  Admit to OBGYN   NPO  F/u T&S, CBC, RPR   IVF LR 125cc/hr   Continuous fetal monitoring and tocometry   Anesthesia consult  Breech by TAUS  Proceed to OR for 1LTCS       Anemia during pregnancy in third trimester-resolved as of 2024  Assessment & Plan  Hgb most recently 11.5  Follow-up admission CBC    Marginal insertion of umbilical cord affecting management of mother in third trimester-resolved as of 2024  Assessment & Plan           Disposition    - Anticipate discharge home today              - PP contraception partner vasectomy       Subjective/Objective     Chief Complaint: Postpartum State     Subjective:    Ama Morris is PPD/POD#2 s/p  primary  section, low transverse incision. She has no current complaints.  Pain is well controlled with Motrin and tylenol. She is recovering well and is stable.     Breastfeeding:  no  Tolerating PO: yes  Nausea or Vomiting: no  Voiding: yes  Flatus: yes; has had bowel movement.   Ambulating: yes  Chest pain: no  Shortness of breath: no  Leg pain: no  Lochia: small/ moderate    Vitals:   /79   Pulse 97   Temp 98.1 °F (36.7 °C)   Resp 18   Ht 5' 7\" (1.702 m)   Wt 102 kg (224 lb)   LMP 2023   SpO2 98%   Breastfeeding No   BMI 35.08 kg/m²       Intake/Output Summary (Last 24 hours) at 2024 0846  Last data filed at " 5/7/2024 0901  Gross per 24 hour   Intake --   Output 800 ml   Net -800 ml       Invasive Devices       None                   Physical Exam:   GEN: Ama Morris appears well, alert and oriented x 3, pleasant and cooperative   CARDIO: RRR, no murmurs or rubs  RESP:  CTAB, no wheezes or rales  ABDOMEN: soft, no tenderness, no distention, fundus @ U-1, Incision C/D/I with steri strips   EXTREMITIES:  non tender, +1 edema  B/L , b/l Nilsa's sign negative      Labs:     Hemoglobin   Date Value Ref Range Status   05/07/2024 10.0 (L) 11.5 - 15.4 g/dL Final   05/06/2024 11.4 (L) 11.5 - 15.4 g/dL Final     WBC   Date Value Ref Range Status   05/07/2024 10.43 (H) 4.31 - 10.16 Thousand/uL Final   05/06/2024 10.31 (H) 4.31 - 10.16 Thousand/uL Final     Platelets   Date Value Ref Range Status   05/07/2024 254 149 - 390 Thousands/uL Final   05/06/2024 333 149 - 390 Thousands/uL Final          LORE Johnson  5/8/2024  8:46 AM

## 2024-05-08 NOTE — PLAN OF CARE
Problem: POSTPARTUM  Goal: Experiences normal postpartum course  Description: INTERVENTIONS:  - Monitor maternal vital signs  - Assess uterine involution and lochia  Outcome: Completed  Goal: Appropriate maternal -  bonding  Description: INTERVENTIONS:  - Identify family support  - Assess for appropriate maternal/infant bonding   -Encourage maternal/infant bonding opportunities  - Referral to  or  as needed  Outcome: Completed  Goal: Establishment of infant feeding pattern  Description: INTERVENTIONS:  - Assess breast/bottle feeding  - Refer to lactation as needed  Outcome: Completed  Goal: Incision(s), wounds(s) or drain site(s) healing without S/S of infection  Description: INTERVENTIONS  - Assess and document dressing, incision, wound bed, drain sites and surrounding tissue  - Provide patient and family education    Outcome: Completed

## 2024-05-14 LAB — PLACENTA IN STORAGE: NORMAL

## 2024-05-28 ENCOUNTER — TELEPHONE (OUTPATIENT)
Age: 39
End: 2024-05-28

## 2024-05-28 NOTE — TELEPHONE ENCOUNTER
Established pt called, delivered via c/s 5/6. Pt advised their Steri strips are still in tact & inquired if they should remove them or leave alone. Attempted warm transfer, unavailable. Please reach out to discuss. Pt denies any symptoms/ concerns postpartum.

## 2024-06-11 ENCOUNTER — POSTPARTUM VISIT (OUTPATIENT)
Dept: OBGYN CLINIC | Facility: MEDICAL CENTER | Age: 39
End: 2024-06-11

## 2024-06-11 VITALS
WEIGHT: 197.7 LBS | DIASTOLIC BLOOD PRESSURE: 62 MMHG | BODY MASS INDEX: 31.03 KG/M2 | SYSTOLIC BLOOD PRESSURE: 100 MMHG | HEIGHT: 67 IN

## 2024-06-11 DIAGNOSIS — Z98.891 S/P PRIMARY LOW TRANSVERSE C-SECTION: ICD-10-CM

## 2024-06-11 PROBLEM — O09.813 PREGNANCY RESULTING FROM IN VITRO FERTILIZATION IN THIRD TRIMESTER: Status: RESOLVED | Noted: 2023-11-03 | Resolved: 2024-06-11

## 2024-06-11 PROBLEM — O09.523 MULTIGRAVIDA OF ADVANCED MATERNAL AGE IN THIRD TRIMESTER: Status: RESOLVED | Noted: 2023-11-02 | Resolved: 2024-06-11

## 2024-06-11 PROCEDURE — 99024 POSTOP FOLLOW-UP VISIT: CPT | Performed by: OBSTETRICS & GYNECOLOGY

## 2024-06-11 NOTE — PROGRESS NOTES
"Postpartum Visit   [facility] of [address], [place], [place]    Assessment/Plan:  Ama is a 39 y.o. year old  who presents for postpartum visit.    Routine Postpartum Care  - Normal postpartum exam  - Contraception: vasectomy  - Depression Screen: neg  - Feeding: formula  - Psychosocial support: appropriate  - Patient Education: Postpartum resources including Pelvic Health PT and Baby & Me  - Cervical cancer screening Up to Date      Additional Problems:  1. Postpartum exam  2. S/P primary low transverse         Subjective:     CC: Postpartum visit    Ama Morris is a 39 y.o. female  who presents for a postpartum visit.     She is approximately 5 weeks postpartum following a 1LTCS on  at 39 weeks. Postpartum course has been uncomplicated. Bleeding thin lochia. Bowel function is normal. Bladder function is normal. Patient is not sexually active. Contraception method is vasectomy. Postpartum depression screening: negative.    Baby's course has been uncomplicated. Baby is feeding by formula.     The following portions of the patient's history were reviewed and updated as appropriate: allergies, current medications, past family history, past medical history, obstetric history, gynecologic history, past social history, past surgical history and problem list.      Objective:  /62   Ht 5' 7\" (1.702 m)   Wt 89.7 kg (197 lb 11.2 oz)   LMP 2023   Breastfeeding No   BMI 30.96 kg/m²   Pregravid Weight/BMI: 80.7 kg (178 lb) (BMI 27.87)  Current Weight: 89.7 kg (197 lb 11.2 oz)   Total Weight Gain: 20.9 kg (46 lb)   Pre- Vitals      Flowsheet Row Most Recent Value   Prenatal Assessment    Prenatal Vitals    Blood Pressure 100/62   Weight - Scale 89.7 kg (197 lb 11.2 oz)   Urine Albumin/Glucose    Dilation/Effacement/Station    Vaginal Drainage    Edema              General: Well appearing, no distress.  Mood and affect: " Appropriate.  Respiratory: Unlabored breathing. Clear to auscultate.  Cardiovascular: Regular rate and rhythm.  Abdomen: Soft, nontender  Incision: intact  Extremities: Warm and well perfused.  Non tender.

## (undated) DEVICE — SUT CHROMIC 0 CT-1 27 IN 812H

## (undated) DEVICE — KIT,BETHLEHEM C SECT DELIVERY: Brand: CARDINAL HEALTH

## (undated) DEVICE — SWABSTCK, BENZOIN TINCTURE, 1/PK, STRL: Brand: APLICARE

## (undated) DEVICE — SUT VICRYL 0 CT-1 36 IN J946H

## (undated) DEVICE — GLOVE INDICATOR PI UNDERGLOVE SZ 7.5 BLUE

## (undated) DEVICE — GLOVE SRG BIOGEL ECLIPSE 7

## (undated) DEVICE — ABG MICROSTICKS SAFETY

## (undated) DEVICE — CHLORAPREP HI-LITE 26ML ORANGE

## (undated) DEVICE — SUT MONOCRYL 4-0 PS-2 27 IN Y426H

## (undated) DEVICE — 3M™ STERI-STRIP™ REINFORCED ADHESIVE SKIN CLOSURES, R1547, 1/2 IN X 4 IN (12 MM X 100 MM), 6 STRIPS/ENVELOPE: Brand: 3M™ STERI-STRIP™

## (undated) DEVICE — SUT VICRYL 0 CTX 36 IN J978H